# Patient Record
Sex: FEMALE | Race: WHITE | Employment: UNEMPLOYED | URBAN - METROPOLITAN AREA
[De-identification: names, ages, dates, MRNs, and addresses within clinical notes are randomized per-mention and may not be internally consistent; named-entity substitution may affect disease eponyms.]

---

## 2017-04-10 DIAGNOSIS — J41.8 MIXED SIMPLE AND MUCOPURULENT CHRONIC BRONCHITIS (HCC): Primary | ICD-10-CM

## 2017-05-15 ENCOUNTER — TELEPHONE (OUTPATIENT)
Dept: FAMILY MEDICINE CLINIC | Age: 57
End: 2017-05-15

## 2017-06-21 RX ORDER — OMEGA-3S/DHA/EPA/FISH OIL/D3 300MG-1000
CAPSULE ORAL
Qty: 30 TABLET | Refills: 0 | Status: SHIPPED | OUTPATIENT
Start: 2017-06-21 | End: 2018-02-27 | Stop reason: SDUPTHER

## 2017-06-21 RX ORDER — IPRATROPIUM BROMIDE AND ALBUTEROL SULFATE 2.5; .5 MG/3ML; MG/3ML
SOLUTION RESPIRATORY (INHALATION)
Qty: 360 ML | Refills: 0 | Status: SHIPPED | OUTPATIENT
Start: 2017-06-21 | End: 2017-07-14 | Stop reason: SDUPTHER

## 2017-06-21 RX ORDER — PANTOPRAZOLE SODIUM 20 MG/1
TABLET, DELAYED RELEASE ORAL
Qty: 30 TABLET | Refills: 0 | Status: SHIPPED | OUTPATIENT
Start: 2017-06-21 | End: 2017-07-19 | Stop reason: SDUPTHER

## 2017-06-21 RX ORDER — FLUTICASONE PROPIONATE 50 MCG
SPRAY, SUSPENSION (ML) NASAL
Qty: 1 BOTTLE | Refills: 0 | Status: SHIPPED | OUTPATIENT
Start: 2017-06-21 | End: 2017-07-19 | Stop reason: SDUPTHER

## 2017-06-21 RX ORDER — FAMOTIDINE 20 MG/1
TABLET, FILM COATED ORAL
Qty: 30 TABLET | Refills: 0 | Status: SHIPPED | OUTPATIENT
Start: 2017-06-21 | End: 2018-02-27 | Stop reason: SDUPTHER

## 2017-07-14 DIAGNOSIS — J41.8 MIXED SIMPLE AND MUCOPURULENT CHRONIC BRONCHITIS (HCC): ICD-10-CM

## 2017-07-14 RX ORDER — ALBUTEROL SULFATE 90 UG/1
AEROSOL, METERED RESPIRATORY (INHALATION)
Qty: 1 INHALER | Refills: 0 | Status: SHIPPED | OUTPATIENT
Start: 2017-07-14 | End: 2017-08-11 | Stop reason: SDUPTHER

## 2017-07-14 RX ORDER — IPRATROPIUM BROMIDE AND ALBUTEROL SULFATE 2.5; .5 MG/3ML; MG/3ML
SOLUTION RESPIRATORY (INHALATION)
Qty: 30 VIAL | Refills: 0 | Status: SHIPPED | OUTPATIENT
Start: 2017-07-14 | End: 2018-02-27 | Stop reason: SDUPTHER

## 2017-07-20 RX ORDER — PANTOPRAZOLE SODIUM 20 MG/1
TABLET, DELAYED RELEASE ORAL
Qty: 30 TABLET | Refills: 0 | Status: SHIPPED | OUTPATIENT
Start: 2017-07-20 | End: 2017-08-24 | Stop reason: SDUPTHER

## 2017-07-20 RX ORDER — FLUTICASONE PROPIONATE 50 MCG
SPRAY, SUSPENSION (ML) NASAL
Qty: 1 BOTTLE | Refills: 0 | Status: SHIPPED | OUTPATIENT
Start: 2017-07-20 | End: 2017-08-24 | Stop reason: SDUPTHER

## 2017-08-11 DIAGNOSIS — J41.8 MIXED SIMPLE AND MUCOPURULENT CHRONIC BRONCHITIS (HCC): ICD-10-CM

## 2017-08-11 RX ORDER — ALBUTEROL SULFATE 90 MCG
HFA AEROSOL WITH ADAPTER (GRAM) INHALATION
Qty: 6.7 G | Refills: 0 | Status: SHIPPED | OUTPATIENT
Start: 2017-08-11 | End: 2017-09-03 | Stop reason: SDUPTHER

## 2017-09-03 DIAGNOSIS — J41.8 MIXED SIMPLE AND MUCOPURULENT CHRONIC BRONCHITIS (HCC): ICD-10-CM

## 2017-09-05 RX ORDER — ALBUTEROL SULFATE 90 MCG
HFA AEROSOL WITH ADAPTER (GRAM) INHALATION
Qty: 6.7 G | Refills: 0 | Status: SHIPPED | OUTPATIENT
Start: 2017-09-05 | End: 2017-09-27 | Stop reason: SDUPTHER

## 2017-09-27 DIAGNOSIS — J41.8 MIXED SIMPLE AND MUCOPURULENT CHRONIC BRONCHITIS (HCC): ICD-10-CM

## 2017-09-28 RX ORDER — ALBUTEROL SULFATE 90 MCG
HFA AEROSOL WITH ADAPTER (GRAM) INHALATION
Qty: 6.7 G | Refills: 0 | Status: SHIPPED | OUTPATIENT
Start: 2017-09-28 | End: 2017-10-24 | Stop reason: SDUPTHER

## 2017-10-24 DIAGNOSIS — J41.8 MIXED SIMPLE AND MUCOPURULENT CHRONIC BRONCHITIS (HCC): ICD-10-CM

## 2017-10-25 RX ORDER — ALBUTEROL SULFATE 90 MCG
HFA AEROSOL WITH ADAPTER (GRAM) INHALATION
Qty: 6.7 G | Refills: 0 | Status: SHIPPED | OUTPATIENT
Start: 2017-10-25 | End: 2018-02-27 | Stop reason: SDUPTHER

## 2018-02-27 DIAGNOSIS — J41.8 MIXED SIMPLE AND MUCOPURULENT CHRONIC BRONCHITIS (HCC): ICD-10-CM

## 2018-02-27 RX ORDER — FAMOTIDINE 20 MG/1
TABLET, FILM COATED ORAL
Qty: 30 TABLET | Refills: 0 | Status: SHIPPED | OUTPATIENT
Start: 2018-02-27

## 2018-02-27 RX ORDER — OMEGA-3S/DHA/EPA/FISH OIL/D3 300MG-1000
CAPSULE ORAL
Qty: 30 TABLET | Refills: 0 | Status: SHIPPED | OUTPATIENT
Start: 2018-02-27

## 2018-02-27 RX ORDER — ALBUTEROL SULFATE 90 MCG
HFA AEROSOL WITH ADAPTER (GRAM) INHALATION
Qty: 6.7 G | Refills: 0 | Status: SHIPPED | OUTPATIENT
Start: 2018-02-27 | End: 2020-06-21 | Stop reason: SDUPTHER

## 2018-02-27 RX ORDER — IPRATROPIUM BROMIDE AND ALBUTEROL SULFATE 2.5; .5 MG/3ML; MG/3ML
SOLUTION RESPIRATORY (INHALATION)
Qty: 90 ML | Refills: 0 | Status: SHIPPED | OUTPATIENT
Start: 2018-02-27 | End: 2020-06-21 | Stop reason: SDUPTHER

## 2018-03-23 RX ORDER — FLUTICASONE PROPIONATE 50 MCG
SPRAY, SUSPENSION (ML) NASAL
Qty: 1 BOTTLE | Refills: 0 | Status: SHIPPED | OUTPATIENT
Start: 2018-03-23

## 2018-03-23 RX ORDER — PANTOPRAZOLE SODIUM 20 MG/1
TABLET, DELAYED RELEASE ORAL
Qty: 30 TABLET | Refills: 0 | Status: SHIPPED | OUTPATIENT
Start: 2018-03-23

## 2019-08-15 ENCOUNTER — HOSPITAL ENCOUNTER (OUTPATIENT)
Dept: GENERAL RADIOLOGY | Age: 59
Discharge: HOME | End: 2019-08-17
Payer: COMMERCIAL

## 2019-08-15 ENCOUNTER — HOSPITAL ENCOUNTER (OUTPATIENT)
Age: 59
Discharge: HOME | End: 2019-08-17
Payer: COMMERCIAL

## 2019-08-15 DIAGNOSIS — J44.1 CHRONIC OBSTRUCTIVE PULMONARY DISEASE WITH ACUTE EXACERBATION (HCC): ICD-10-CM

## 2019-08-15 DIAGNOSIS — R52 PAIN: ICD-10-CM

## 2019-08-15 PROCEDURE — 72220 X-RAY EXAM SACRUM TAILBONE: CPT

## 2019-08-15 PROCEDURE — 71046 X-RAY EXAM CHEST 2 VIEWS: CPT

## 2019-08-20 ENCOUNTER — HOSPITAL ENCOUNTER (OUTPATIENT)
Age: 59
Setting detail: SPECIMEN
Discharge: HOME | End: 2019-08-20
Payer: COMMERCIAL

## 2019-08-23 LAB
HPV SAMPLE: NORMAL
HPV, GENOTYPE 16: NOT DETECTED
HPV, GENOTYPE 18: NOT DETECTED
HPV, HIGH RISK OTHER: NOT DETECTED
HPV, INTERPRETATION: NORMAL
SPECIMEN DESCRIPTION: NORMAL

## 2019-08-29 LAB — CYTOLOGY REPORT: NORMAL

## 2019-09-29 ENCOUNTER — APPOINTMENT (OUTPATIENT)
Dept: GENERAL RADIOLOGY | Age: 59
End: 2019-09-29
Payer: COMMERCIAL

## 2019-09-29 ENCOUNTER — HOSPITAL ENCOUNTER (EMERGENCY)
Age: 59
Discharge: HOME | End: 2019-09-29
Attending: EMERGENCY MEDICINE
Payer: COMMERCIAL

## 2019-09-29 VITALS
DIASTOLIC BLOOD PRESSURE: 78 MMHG | WEIGHT: 107 LBS | HEIGHT: 60 IN | TEMPERATURE: 98.5 F | SYSTOLIC BLOOD PRESSURE: 121 MMHG | RESPIRATION RATE: 20 BRPM | HEART RATE: 100 BPM | OXYGEN SATURATION: 100 % | BODY MASS INDEX: 21.01 KG/M2

## 2019-09-29 DIAGNOSIS — J44.1 COPD EXACERBATION (HCC): Primary | ICD-10-CM

## 2019-09-29 LAB
ABSOLUTE EOS #: 0.14 K/UL (ref 0–0.44)
ABSOLUTE IMMATURE GRANULOCYTE: 0.05 K/UL (ref 0–0.3)
ABSOLUTE LYMPH #: 1.91 K/UL (ref 1.1–3.7)
ABSOLUTE MONO #: 0.66 K/UL (ref 0.1–1.2)
ANION GAP SERPL CALCULATED.3IONS-SCNC: 13 MMOL/L (ref 9–17)
BASOPHILS # BLD: 0 % (ref 0–2)
BASOPHILS ABSOLUTE: 0.05 K/UL (ref 0–0.2)
BUN BLDV-MCNC: 7 MG/DL (ref 6–20)
BUN/CREAT BLD: ABNORMAL (ref 9–20)
CALCIUM SERPL-MCNC: 9.6 MG/DL (ref 8.6–10.4)
CHLORIDE BLD-SCNC: 98 MMOL/L (ref 98–107)
CO2: 27 MMOL/L (ref 20–31)
CREAT SERPL-MCNC: 0.4 MG/DL (ref 0.5–0.9)
DIFFERENTIAL TYPE: ABNORMAL
EOSINOPHILS RELATIVE PERCENT: 1 % (ref 1–4)
GFR AFRICAN AMERICAN: >60 ML/MIN
GFR NON-AFRICAN AMERICAN: >60 ML/MIN
GFR SERPL CREATININE-BSD FRML MDRD: ABNORMAL ML/MIN/{1.73_M2}
GFR SERPL CREATININE-BSD FRML MDRD: ABNORMAL ML/MIN/{1.73_M2}
GLUCOSE BLD-MCNC: 139 MG/DL (ref 70–99)
HCT VFR BLD CALC: 39.4 % (ref 36.3–47.1)
HEMOGLOBIN: 12.5 G/DL (ref 11.9–15.1)
IMMATURE GRANULOCYTES: 0 %
LYMPHOCYTES # BLD: 16 % (ref 24–43)
MCH RBC QN AUTO: 29.6 PG (ref 25.2–33.5)
MCHC RBC AUTO-ENTMCNC: 31.7 G/DL (ref 28.4–34.8)
MCV RBC AUTO: 93.4 FL (ref 82.6–102.9)
MONOCYTES # BLD: 5 % (ref 3–12)
NRBC AUTOMATED: 0 PER 100 WBC
PDW BLD-RTO: 12.3 % (ref 11.8–14.4)
PLATELET # BLD: 231 K/UL (ref 138–453)
PLATELET ESTIMATE: ABNORMAL
PMV BLD AUTO: 12 FL (ref 8.1–13.5)
POTASSIUM SERPL-SCNC: 3.7 MMOL/L (ref 3.7–5.3)
RBC # BLD: 4.22 M/UL (ref 3.95–5.11)
RBC # BLD: ABNORMAL 10*6/UL
SEG NEUTROPHILS: 78 % (ref 36–65)
SEGMENTED NEUTROPHILS ABSOLUTE COUNT: 9.34 K/UL (ref 1.5–8.1)
SODIUM BLD-SCNC: 138 MMOL/L (ref 135–144)
TROPONIN INTERP: NORMAL
TROPONIN T: NORMAL NG/ML
TROPONIN, HIGH SENSITIVITY: 6 NG/L (ref 0–14)
WBC # BLD: 12.2 K/UL (ref 3.5–11.3)
WBC # BLD: ABNORMAL 10*3/UL

## 2019-09-29 PROCEDURE — 93005 ELECTROCARDIOGRAM TRACING: CPT | Performed by: STUDENT IN AN ORGANIZED HEALTH CARE EDUCATION/TRAINING PROGRAM

## 2019-09-29 PROCEDURE — 71046 X-RAY EXAM CHEST 2 VIEWS: CPT

## 2019-09-29 PROCEDURE — 85025 COMPLETE CBC W/AUTO DIFF WBC: CPT

## 2019-09-29 PROCEDURE — 6370000000 HC RX 637 (ALT 250 FOR IP): Performed by: STUDENT IN AN ORGANIZED HEALTH CARE EDUCATION/TRAINING PROGRAM

## 2019-09-29 PROCEDURE — 6360000002 HC RX W HCPCS: Performed by: EMERGENCY MEDICINE

## 2019-09-29 PROCEDURE — 99285 EMERGENCY DEPT VISIT HI MDM: CPT

## 2019-09-29 PROCEDURE — 94640 AIRWAY INHALATION TREATMENT: CPT

## 2019-09-29 PROCEDURE — 80048 BASIC METABOLIC PNL TOTAL CA: CPT

## 2019-09-29 PROCEDURE — 84484 ASSAY OF TROPONIN QUANT: CPT

## 2019-09-29 RX ORDER — ALBUTEROL SULFATE 2.5 MG/3ML
5 SOLUTION RESPIRATORY (INHALATION) EVERY 6 HOURS PRN
Status: DISCONTINUED | OUTPATIENT
Start: 2019-09-29 | End: 2019-09-29 | Stop reason: HOSPADM

## 2019-09-29 RX ORDER — PREDNISONE 20 MG/1
40 TABLET ORAL DAILY
Qty: 8 TABLET | Refills: 0 | Status: SHIPPED | OUTPATIENT
Start: 2019-09-29 | End: 2019-10-03

## 2019-09-29 RX ORDER — PREDNISONE 20 MG/1
40 TABLET ORAL DAILY
Status: DISCONTINUED | OUTPATIENT
Start: 2019-09-29 | End: 2019-09-29 | Stop reason: HOSPADM

## 2019-09-29 RX ADMIN — PREDNISONE 40 MG: 20 TABLET ORAL at 16:58

## 2019-09-29 RX ADMIN — ALBUTEROL SULFATE 5 MG: 2.5 SOLUTION RESPIRATORY (INHALATION) at 16:08

## 2019-09-29 ASSESSMENT — ENCOUNTER SYMPTOMS
DIARRHEA: 0
ABDOMINAL PAIN: 0
VOMITING: 0
SHORTNESS OF BREATH: 1
CHEST TIGHTNESS: 0
SINUS PRESSURE: 0
EYES NEGATIVE: 1
SORE THROAT: 0
ABDOMINAL DISTENTION: 0
NAUSEA: 0
COUGH: 1
SINUS PAIN: 0
WHEEZING: 0
RHINORRHEA: 0
ALLERGIC/IMMUNOLOGIC NEGATIVE: 1

## 2019-09-29 ASSESSMENT — PAIN DESCRIPTION - PROGRESSION: CLINICAL_PROGRESSION: NOT CHANGED

## 2019-09-29 ASSESSMENT — PAIN DESCRIPTION - FREQUENCY: FREQUENCY: CONTINUOUS

## 2019-09-29 ASSESSMENT — PAIN DESCRIPTION - LOCATION: LOCATION: HEAD;CHEST

## 2019-09-29 ASSESSMENT — PAIN DESCRIPTION - ONSET: ONSET: ON-GOING

## 2019-09-30 LAB
EKG ATRIAL RATE: 100 BPM
EKG P AXIS: 68 DEGREES
EKG P-R INTERVAL: 110 MS
EKG Q-T INTERVAL: 322 MS
EKG QRS DURATION: 78 MS
EKG QTC CALCULATION (BAZETT): 415 MS
EKG R AXIS: 87 DEGREES
EKG T AXIS: 61 DEGREES
EKG VENTRICULAR RATE: 100 BPM

## 2019-09-30 PROCEDURE — 93010 ELECTROCARDIOGRAM REPORT: CPT | Performed by: INTERNAL MEDICINE

## 2020-01-17 ENCOUNTER — APPOINTMENT (OUTPATIENT)
Dept: GENERAL RADIOLOGY | Age: 60
End: 2020-01-17
Payer: COMMERCIAL

## 2020-01-17 ENCOUNTER — HOSPITAL ENCOUNTER (EMERGENCY)
Age: 60
Discharge: HOME | End: 2020-01-17
Attending: EMERGENCY MEDICINE
Payer: COMMERCIAL

## 2020-01-17 VITALS
OXYGEN SATURATION: 95 % | TEMPERATURE: 98.1 F | SYSTOLIC BLOOD PRESSURE: 159 MMHG | BODY MASS INDEX: 20.81 KG/M2 | RESPIRATION RATE: 22 BRPM | HEIGHT: 60 IN | DIASTOLIC BLOOD PRESSURE: 95 MMHG | WEIGHT: 106 LBS | HEART RATE: 94 BPM

## 2020-01-17 LAB
ABSOLUTE EOS #: 0.24 K/UL (ref 0–0.44)
ABSOLUTE IMMATURE GRANULOCYTE: 0.04 K/UL (ref 0–0.3)
ABSOLUTE LYMPH #: 2.72 K/UL (ref 1.1–3.7)
ABSOLUTE MONO #: 0.75 K/UL (ref 0.1–1.2)
ANION GAP SERPL CALCULATED.3IONS-SCNC: 15 MMOL/L (ref 9–17)
BASOPHILS # BLD: 1 % (ref 0–2)
BASOPHILS ABSOLUTE: 0.07 K/UL (ref 0–0.2)
BUN BLDV-MCNC: 17 MG/DL (ref 6–20)
BUN/CREAT BLD: ABNORMAL (ref 9–20)
CALCIUM SERPL-MCNC: 9.8 MG/DL (ref 8.6–10.4)
CHLORIDE BLD-SCNC: 103 MMOL/L (ref 98–107)
CO2: 25 MMOL/L (ref 20–31)
CREAT SERPL-MCNC: 0.74 MG/DL (ref 0.5–0.9)
DIFFERENTIAL TYPE: NORMAL
EOSINOPHILS RELATIVE PERCENT: 3 % (ref 1–4)
GFR AFRICAN AMERICAN: >60 ML/MIN
GFR NON-AFRICAN AMERICAN: >60 ML/MIN
GFR SERPL CREATININE-BSD FRML MDRD: ABNORMAL ML/MIN/{1.73_M2}
GFR SERPL CREATININE-BSD FRML MDRD: ABNORMAL ML/MIN/{1.73_M2}
GLUCOSE BLD-MCNC: 104 MG/DL (ref 70–99)
HCT VFR BLD CALC: 41.4 % (ref 36.3–47.1)
HEMOGLOBIN: 13 G/DL (ref 11.9–15.1)
IMMATURE GRANULOCYTES: 0 %
LYMPHOCYTES # BLD: 30 % (ref 24–43)
MCH RBC QN AUTO: 28.9 PG (ref 25.2–33.5)
MCHC RBC AUTO-ENTMCNC: 31.4 G/DL (ref 28.4–34.8)
MCV RBC AUTO: 92 FL (ref 82.6–102.9)
MONOCYTES # BLD: 8 % (ref 3–12)
NRBC AUTOMATED: 0 PER 100 WBC
PDW BLD-RTO: 14.3 % (ref 11.8–14.4)
PLATELET # BLD: 262 K/UL (ref 138–453)
PLATELET ESTIMATE: NORMAL
PMV BLD AUTO: 11.4 FL (ref 8.1–13.5)
POTASSIUM SERPL-SCNC: 4.2 MMOL/L (ref 3.7–5.3)
RBC # BLD: 4.5 M/UL (ref 3.95–5.11)
RBC # BLD: NORMAL 10*6/UL
SEG NEUTROPHILS: 58 % (ref 36–65)
SEGMENTED NEUTROPHILS ABSOLUTE COUNT: 5.13 K/UL (ref 1.5–8.1)
SODIUM BLD-SCNC: 143 MMOL/L (ref 135–144)
TROPONIN INTERP: NORMAL
TROPONIN T: NORMAL NG/ML
TROPONIN, HIGH SENSITIVITY: 8 NG/L (ref 0–14)
WBC # BLD: 9 K/UL (ref 3.5–11.3)
WBC # BLD: NORMAL 10*3/UL

## 2020-01-17 PROCEDURE — 84484 ASSAY OF TROPONIN QUANT: CPT

## 2020-01-17 PROCEDURE — 6370000000 HC RX 637 (ALT 250 FOR IP): Performed by: STUDENT IN AN ORGANIZED HEALTH CARE EDUCATION/TRAINING PROGRAM

## 2020-01-17 PROCEDURE — 71046 X-RAY EXAM CHEST 2 VIEWS: CPT

## 2020-01-17 PROCEDURE — 93005 ELECTROCARDIOGRAM TRACING: CPT | Performed by: STUDENT IN AN ORGANIZED HEALTH CARE EDUCATION/TRAINING PROGRAM

## 2020-01-17 PROCEDURE — 94640 AIRWAY INHALATION TREATMENT: CPT

## 2020-01-17 PROCEDURE — 85025 COMPLETE CBC W/AUTO DIFF WBC: CPT

## 2020-01-17 PROCEDURE — 99285 EMERGENCY DEPT VISIT HI MDM: CPT

## 2020-01-17 PROCEDURE — 80048 BASIC METABOLIC PNL TOTAL CA: CPT

## 2020-01-17 PROCEDURE — 6360000002 HC RX W HCPCS: Performed by: STUDENT IN AN ORGANIZED HEALTH CARE EDUCATION/TRAINING PROGRAM

## 2020-01-17 RX ORDER — PREDNISONE 20 MG/1
40 TABLET ORAL ONCE
Status: COMPLETED | OUTPATIENT
Start: 2020-01-17 | End: 2020-01-17

## 2020-01-17 RX ORDER — PREDNISONE 10 MG/1
TABLET ORAL
Qty: 20 TABLET | Refills: 0 | Status: SHIPPED | OUTPATIENT
Start: 2020-01-17 | End: 2020-01-27

## 2020-01-17 RX ADMIN — ALBUTEROL SULFATE 5 MG: 5 SOLUTION RESPIRATORY (INHALATION) at 17:23

## 2020-01-17 RX ADMIN — PREDNISONE 40 MG: 20 TABLET ORAL at 16:38

## 2020-01-17 ASSESSMENT — ENCOUNTER SYMPTOMS
VOMITING: 0
PHOTOPHOBIA: 0
STRIDOR: 0
BACK PAIN: 0
SHORTNESS OF BREATH: 1
NAUSEA: 0
SORE THROAT: 0
ABDOMINAL PAIN: 0
COUGH: 1
RHINORRHEA: 0

## 2020-01-17 NOTE — ED PROVIDER NOTES
Spouse name: Not on file    Number of children: Not on file    Years of education: Not on file    Highest education level: Not on file   Occupational History    Not on file   Social Needs    Financial resource strain: Not on file    Food insecurity:     Worry: Not on file     Inability: Not on file    Transportation needs:     Medical: Not on file     Non-medical: Not on file   Tobacco Use    Smoking status: Current Every Day Smoker     Packs/day: 0.50     Years: 38.00     Pack years: 19.00     Types: Cigarettes    Smokeless tobacco: Never Used   Substance and Sexual Activity    Alcohol use: Yes     Comment: once in a while  1 - 2 x year    Drug use: No    Sexual activity: Not Currently   Lifestyle    Physical activity:     Days per week: Not on file     Minutes per session: Not on file    Stress: Not on file   Relationships    Social connections:     Talks on phone: Not on file     Gets together: Not on file     Attends [employer]ist service: Not on file     Active member of club or organization: Not on file     Attends meetings of clubs or organizations: Not on file     Relationship status: Not on file    Intimate partner violence:     Fear of current or ex partner: Not on file     Emotionally abused: Not on file     Physically abused: Not on file     Forced sexual activity: Not on file   Other Topics Concern    Not on file   Social History Narrative    Not on file       Family History   Problem Relation Age of Onset    Early Death Father        Allergies:  Neosporin [neomycin-polymyx-gramicid] and Penicillins    Home Medications:  Prior to Admission medications    Medication Sig Start Date End Date Taking?  Authorizing Provider   predniSONE (DELTASONE) 10 MG tablet Take 4 tablets by mouth once daily for 5 days 1/17/20 1/27/20 Yes Carlos Angeles DO   pantoprazole (PROTONIX) 20 MG tablet TAKE 1 TABLET BY MOUTH DAILY 3/23/18   ALEXIA Hartman - CNP   fluticasone (FLONASE) 50 MCG/ACT nasal spray SHAKE orders placed or performed during the hospital encounter of 01/17/20   CBC WITH AUTO DIFFERENTIAL   Result Value Ref Range    WBC 9.0 3.5 - 11.3 k/uL    RBC 4.50 3.95 - 5.11 m/uL    Hemoglobin 13.0 11.9 - 15.1 g/dL    Hematocrit 41.4 36.3 - 47.1 %    MCV 92.0 82.6 - 102.9 fL    MCH 28.9 25.2 - 33.5 pg    MCHC 31.4 28.4 - 34.8 g/dL    RDW 14.3 11.8 - 14.4 %    Platelets 346 838 - 862 k/uL    MPV 11.4 8.1 - 13.5 fL    NRBC Automated 0.0 0.0 per 100 WBC    Differential Type NOT REPORTED     Seg Neutrophils 58 36 - 65 %    Lymphocytes 30 24 - 43 %    Monocytes 8 3 - 12 %    Eosinophils % 3 1 - 4 %    Basophils 1 0 - 2 %    Immature Granulocytes 0 0 %    Segs Absolute 5.13 1.50 - 8.10 k/uL    Absolute Lymph # 2.72 1.10 - 3.70 k/uL    Absolute Mono # 0.75 0.10 - 1.20 k/uL    Absolute Eos # 0.24 0.00 - 0.44 k/uL    Basophils Absolute 0.07 0.00 - 0.20 k/uL    Absolute Immature Granulocyte 0.04 0.00 - 0.30 k/uL    WBC Morphology NOT REPORTED     RBC Morphology NOT REPORTED     Platelet Estimate NOT REPORTED    Basic Metabolic Panel   Result Value Ref Range    Glucose 104 (H) 70 - 99 mg/dL    BUN 17 6 - 20 mg/dL    CREATININE 0.74 0.50 - 0.90 mg/dL    Bun/Cre Ratio NOT REPORTED 9 - 20    Calcium 9.8 8.6 - 10.4 mg/dL    Sodium 143 135 - 144 mmol/L    Potassium 4.2 3.7 - 5.3 mmol/L    Chloride 103 98 - 107 mmol/L    CO2 25 20 - 31 mmol/L    Anion Gap 15 9 - 17 mmol/L    GFR Non-African American >60 >60 mL/min    GFR African American >60 >60 mL/min    GFR Comment          GFR Staging NOT REPORTED    Troponin   Result Value Ref Range    Troponin, High Sensitivity 8 0 - 14 ng/L    Troponin T NOT REPORTED <0.03 ng/mL    Troponin Interp NOT REPORTED            RADIOLOGY:  None    EKG  None    All EKG's are interpreted by the Emergency Department Physician who either signs or Co-signs this chart in the absence of a cardiologist.    EMERGENCY DEPARTMENT COURSE:  56-year-old female history of COPD presents with reported shortness of

## 2020-01-17 NOTE — ED NOTES
Pt. Ambulatory with steady gait to ER room 20 from triage  Pt. Presents with shortness of breath that started earlier today; pt has a history of COPD and is supposed to wear oxygen via nasal cannula at night but has had trouble getting this ordered  Pt. Is tachycardic on vitals assessment, hypertensive and appears anxious; pt oxygen saturation 95% on room air on arrival; respiratory at bedside  Pt. Sounds clear throughout all lobes of lungs; per respiratory to hold off treatment unless ordered by physician  Pt. Placed in gown, EKG obtained, IV access established  Pt. Begins to calm down during triage examination and states she is feeling better at this time  Pt. A/Ox4, RR even and unlabored, NAD at this time  Pt.  Given call light and warm blankets  Awaiting further orders  Will continue to monitor and reassess     Ileana Pink RN  01/17/20 4283

## 2020-01-17 NOTE — CARE COORDINATION
Met with patient to discuss issues with oxygen. She states that she has been using a friend's oxygen machine at night, because her original one was left in a storage unit. She states she has an appointment with Respiratory Specialist, [address]. Per [facility] her appointment is scheduled with Dr Arturo Plummer on 3-25-20 at 1330. The pulmonary office is closed at this time,  Patient encouraged to call the office on 1-, to explain her current situation, and ask if she can have an earlier appointment. patient expresses understanding. Discussed skilled nursing visits, and she declines at this time. She states she understands how to take her medications, and does not see the need for a nurse at this time.

## 2020-01-18 LAB
EKG ATRIAL RATE: 89 BPM
EKG P AXIS: 73 DEGREES
EKG P-R INTERVAL: 126 MS
EKG Q-T INTERVAL: 356 MS
EKG QRS DURATION: 72 MS
EKG QTC CALCULATION (BAZETT): 433 MS
EKG R AXIS: 78 DEGREES
EKG T AXIS: 67 DEGREES
EKG VENTRICULAR RATE: 89 BPM

## 2020-01-18 PROCEDURE — 93010 ELECTROCARDIOGRAM REPORT: CPT | Performed by: INTERNAL MEDICINE

## 2020-06-21 ENCOUNTER — APPOINTMENT (OUTPATIENT)
Dept: GENERAL RADIOLOGY | Age: 60
End: 2020-06-21
Payer: COMMERCIAL

## 2020-06-21 ENCOUNTER — HOSPITAL ENCOUNTER (EMERGENCY)
Age: 60
Discharge: HOME | End: 2020-06-21
Attending: EMERGENCY MEDICINE
Payer: COMMERCIAL

## 2020-06-21 VITALS
WEIGHT: 106 LBS | OXYGEN SATURATION: 100 % | HEART RATE: 87 BPM | HEIGHT: 60 IN | TEMPERATURE: 97.1 F | DIASTOLIC BLOOD PRESSURE: 91 MMHG | RESPIRATION RATE: 21 BRPM | SYSTOLIC BLOOD PRESSURE: 108 MMHG | BODY MASS INDEX: 20.81 KG/M2

## 2020-06-21 LAB
ABSOLUTE EOS #: 0.16 K/UL (ref 0–0.44)
ABSOLUTE IMMATURE GRANULOCYTE: <0.03 K/UL (ref 0–0.3)
ABSOLUTE LYMPH #: 2.51 K/UL (ref 1.1–3.7)
ABSOLUTE MONO #: 0.39 K/UL (ref 0.1–1.2)
ALLEN TEST: ABNORMAL
ANION GAP SERPL CALCULATED.3IONS-SCNC: 14 MMOL/L (ref 9–17)
BASOPHILS # BLD: 1 % (ref 0–2)
BASOPHILS ABSOLUTE: 0.07 K/UL (ref 0–0.2)
BUN BLDV-MCNC: 10 MG/DL (ref 6–20)
BUN/CREAT BLD: ABNORMAL (ref 9–20)
CALCIUM SERPL-MCNC: 9.4 MG/DL (ref 8.6–10.4)
CARBOXYHEMOGLOBIN: 3.8 % (ref 0–5)
CHLORIDE BLD-SCNC: 100 MMOL/L (ref 98–107)
CO2: 24 MMOL/L (ref 20–31)
CREAT SERPL-MCNC: 0.5 MG/DL (ref 0.5–0.9)
D-DIMER QUANTITATIVE: 0.21 MG/L FEU
DIFFERENTIAL TYPE: ABNORMAL
EOSINOPHILS RELATIVE PERCENT: 2 % (ref 1–4)
FIO2: ABNORMAL
GFR AFRICAN AMERICAN: >60 ML/MIN
GFR NON-AFRICAN AMERICAN: >60 ML/MIN
GFR SERPL CREATININE-BSD FRML MDRD: ABNORMAL ML/MIN/{1.73_M2}
GFR SERPL CREATININE-BSD FRML MDRD: ABNORMAL ML/MIN/{1.73_M2}
GLUCOSE BLD-MCNC: 123 MG/DL (ref 70–99)
HCO3 VENOUS: 28.3 MMOL/L (ref 24–30)
HCT VFR BLD CALC: 48.2 % (ref 36.3–47.1)
HEMOGLOBIN: 15.5 G/DL (ref 11.9–15.1)
IMMATURE GRANULOCYTES: 0 %
LYMPHOCYTES # BLD: 34 % (ref 24–43)
MCH RBC QN AUTO: 29.5 PG (ref 25.2–33.5)
MCHC RBC AUTO-ENTMCNC: 32.2 G/DL (ref 28.4–34.8)
MCV RBC AUTO: 91.6 FL (ref 82.6–102.9)
METHEMOGLOBIN: ABNORMAL % (ref 0–1.5)
MODE: ABNORMAL
MONOCYTES # BLD: 5 % (ref 3–12)
NEGATIVE BASE EXCESS, VEN: ABNORMAL MMOL/L (ref 0–2)
NOTIFICATION TIME: ABNORMAL
NOTIFICATION: ABNORMAL
NRBC AUTOMATED: 0 PER 100 WBC
O2 DEVICE/FLOW/%: ABNORMAL
O2 SAT, VEN: 86.8 % (ref 60–85)
OXYHEMOGLOBIN: ABNORMAL % (ref 95–98)
PATIENT TEMP: 37
PCO2, VEN, TEMP ADJ: ABNORMAL MMHG (ref 39–55)
PCO2, VEN: 47.5 (ref 39–55)
PDW BLD-RTO: 14 % (ref 11.8–14.4)
PEEP/CPAP: ABNORMAL
PH VENOUS: 7.39 (ref 7.32–7.42)
PH, VEN, TEMP ADJ: ABNORMAL (ref 7.32–7.42)
PLATELET # BLD: 273 K/UL (ref 138–453)
PLATELET ESTIMATE: ABNORMAL
PMV BLD AUTO: 11.2 FL (ref 8.1–13.5)
PO2, VEN, TEMP ADJ: ABNORMAL MMHG (ref 30–50)
PO2, VEN: 50 (ref 30–50)
POSITIVE BASE EXCESS, VEN: 3 MMOL/L (ref 0–2)
POTASSIUM SERPL-SCNC: 4.3 MMOL/L (ref 3.7–5.3)
PSV: ABNORMAL
PT. POSITION: ABNORMAL
RBC # BLD: 5.26 M/UL (ref 3.95–5.11)
RBC # BLD: ABNORMAL 10*6/UL
RESPIRATORY RATE: ABNORMAL
SAMPLE SITE: ABNORMAL
SEG NEUTROPHILS: 58 % (ref 36–65)
SEGMENTED NEUTROPHILS ABSOLUTE COUNT: 4.16 K/UL (ref 1.5–8.1)
SET RATE: ABNORMAL
SODIUM BLD-SCNC: 138 MMOL/L (ref 135–144)
TEXT FOR RESPIRATORY: ABNORMAL
TOTAL HB: ABNORMAL G/DL (ref 12–16)
TOTAL RATE: ABNORMAL
TROPONIN INTERP: NORMAL
TROPONIN INTERP: NORMAL
TROPONIN T: NORMAL NG/ML
TROPONIN T: NORMAL NG/ML
TROPONIN, HIGH SENSITIVITY: 7 NG/L (ref 0–14)
TROPONIN, HIGH SENSITIVITY: <6 NG/L (ref 0–14)
VT: ABNORMAL
WBC # BLD: 7.3 K/UL (ref 3.5–11.3)
WBC # BLD: ABNORMAL 10*3/UL

## 2020-06-21 PROCEDURE — 85379 FIBRIN DEGRADATION QUANT: CPT

## 2020-06-21 PROCEDURE — 94761 N-INVAS EAR/PLS OXIMETRY MLT: CPT

## 2020-06-21 PROCEDURE — 6360000002 HC RX W HCPCS: Performed by: STUDENT IN AN ORGANIZED HEALTH CARE EDUCATION/TRAINING PROGRAM

## 2020-06-21 PROCEDURE — 71045 X-RAY EXAM CHEST 1 VIEW: CPT

## 2020-06-21 PROCEDURE — 94640 AIRWAY INHALATION TREATMENT: CPT

## 2020-06-21 PROCEDURE — 6370000000 HC RX 637 (ALT 250 FOR IP): Performed by: STUDENT IN AN ORGANIZED HEALTH CARE EDUCATION/TRAINING PROGRAM

## 2020-06-21 PROCEDURE — 82805 BLOOD GASES W/O2 SATURATION: CPT

## 2020-06-21 PROCEDURE — 96374 THER/PROPH/DIAG INJ IV PUSH: CPT

## 2020-06-21 PROCEDURE — 36415 COLL VENOUS BLD VENIPUNCTURE: CPT

## 2020-06-21 PROCEDURE — 2700000000 HC OXYGEN THERAPY PER DAY

## 2020-06-21 PROCEDURE — 85025 COMPLETE CBC W/AUTO DIFF WBC: CPT

## 2020-06-21 PROCEDURE — 84484 ASSAY OF TROPONIN QUANT: CPT

## 2020-06-21 PROCEDURE — 93005 ELECTROCARDIOGRAM TRACING: CPT | Performed by: STUDENT IN AN ORGANIZED HEALTH CARE EDUCATION/TRAINING PROGRAM

## 2020-06-21 PROCEDURE — 94664 DEMO&/EVAL PT USE INHALER: CPT

## 2020-06-21 PROCEDURE — 99285 EMERGENCY DEPT VISIT HI MDM: CPT

## 2020-06-21 PROCEDURE — 80048 BASIC METABOLIC PNL TOTAL CA: CPT

## 2020-06-21 RX ORDER — PREDNISONE 10 MG/1
TABLET ORAL
Qty: 20 TABLET | Refills: 0 | Status: SHIPPED | OUTPATIENT
Start: 2020-06-21 | End: 2020-07-01

## 2020-06-21 RX ORDER — IPRATROPIUM BROMIDE AND ALBUTEROL SULFATE 2.5; .5 MG/3ML; MG/3ML
SOLUTION RESPIRATORY (INHALATION)
Qty: 90 ML | Refills: 0 | Status: SHIPPED | OUTPATIENT
Start: 2020-06-21

## 2020-06-21 RX ORDER — ALBUTEROL SULFATE 90 UG/1
AEROSOL, METERED RESPIRATORY (INHALATION)
Qty: 6.7 G | Refills: 0 | Status: SHIPPED | OUTPATIENT
Start: 2020-06-21

## 2020-06-21 RX ORDER — ALBUTEROL SULFATE 90 UG/1
1-2 AEROSOL, METERED RESPIRATORY (INHALATION) EVERY 4 HOURS PRN
Qty: 1 INHALER | Refills: 0 | Status: SHIPPED | OUTPATIENT
Start: 2020-06-21

## 2020-06-21 RX ORDER — AZITHROMYCIN 250 MG/1
250 TABLET, FILM COATED ORAL SEE ADMIN INSTRUCTIONS
Qty: 6 TABLET | Refills: 0 | Status: SHIPPED | OUTPATIENT
Start: 2020-06-21 | End: 2020-06-26

## 2020-06-21 RX ORDER — ALBUTEROL SULFATE 90 UG/1
2 AEROSOL, METERED RESPIRATORY (INHALATION) EVERY 6 HOURS PRN
Status: DISCONTINUED | OUTPATIENT
Start: 2020-06-21 | End: 2020-06-21 | Stop reason: HOSPADM

## 2020-06-21 RX ORDER — METHYLPREDNISOLONE SODIUM SUCCINATE 125 MG/2ML
125 INJECTION, POWDER, LYOPHILIZED, FOR SOLUTION INTRAMUSCULAR; INTRAVENOUS ONCE
Status: COMPLETED | OUTPATIENT
Start: 2020-06-21 | End: 2020-06-21

## 2020-06-21 RX ADMIN — ALBUTEROL SULFATE 2 PUFF: 90 AEROSOL, METERED RESPIRATORY (INHALATION) at 15:04

## 2020-06-21 RX ADMIN — METHYLPREDNISOLONE SODIUM SUCCINATE 125 MG: 125 INJECTION, POWDER, FOR SOLUTION INTRAMUSCULAR; INTRAVENOUS at 15:02

## 2020-06-21 RX ADMIN — IPRATROPIUM BROMIDE 0.5 MG: 0.5 SOLUTION RESPIRATORY (INHALATION) at 15:00

## 2020-06-21 ASSESSMENT — ENCOUNTER SYMPTOMS
SHORTNESS OF BREATH: 1
ABDOMINAL PAIN: 0
NAUSEA: 0
COUGH: 1
BACK PAIN: 0
VOMITING: 0
SORE THROAT: 0

## 2020-06-21 NOTE — ED PROVIDER NOTES
[facility]  ED  Emergency Department Encounter  Emergency Medicine Resident     Pt Name: Courtney Jj  MRN: [id number]  [place] 1960  Date of evaluation: 6/21/20  PCP:  [facility] [place]       Chief Complaint   Patient presents with    Shortness of Breath       HISTORY [place]  (Location/Symptom, Timing/Onset, Context/Setting, Quality, Duration, Modifying Factors,Severity.)      Courtney Jj is a 63 y.o. female who presents with complaints of shortness of breath and productive cough. Patient with history of anxiety, COPD, GERD, depression. Patient states that for the last 2 days she has had worsening shortness of breath as well as a productive cough with clear sputum. She states that this feels like her typical COPD exacerbation but has been out of her home inhalers and her oxygen. Patient does wear oxygen at home but has not had to increase her requirements or use oxygen during the day. Patient denies chest pain, fevers, nausea, vomiting, diaphoresis, abdominal pain, lightheadedness, dizziness, lower extremity swelling or calf pain. Patient denies any history of DVT/PE, recent surgery, mobilization, hormone replacement, hemoptysis, active malignancy. PAST MEDICAL / SURGICAL / SOCIAL / FAMILY HISTORY      has a past medical history of Anxiety, COPD (chronic obstructive pulmonary disease) ([address] COPD exacerbation (HCC), Cough, Current every day smoker, Current every day smoker, Depression, GERD (gastroesophageal reflux disease), Headache(784.0), Mid sternal chest pain, and SOB (shortness of breath). has a past surgical history that includes Tubal ligation and Colonoscopy (4 28 14).      Social History     Socioeconomic History    Marital status:      Spouse name: Not on file    Number of children: Not on file    Years of education: Not on file    Highest education level: Not on file   Occupational History    Not on file   Social Needs  Financial resource strain: Not on file   Sami-Iftikhar insecurity     Worry: Not on file     Inability: Not on file   [employer] needs     Medical: Not on file     Non-medical: Not on file   Tobacco Use    Smoking status: Current Every Day Smoker     Packs/day: 0.50     Years: 38.00     Pack years: 19.00     Types: Cigarettes    Smokeless tobacco: Never Used   Substance and Sexual Activity    Alcohol use: Yes     Comment: once in a while  1 - 2 x year    Drug use: No    Sexual activity: Not Currently   Lifestyle    Physical activity     Days per week: Not on file     Minutes per session: Not on file    Stress: Not on file   Relationships    Social connections     Talks on phone: Not on file     Gets together: Not on file     Attends Scientology service: Not on file     Active member of club or organization: Not on file     Attends meetings of clubs or organizations: Not on file     Relationship status: Not on file    Intimate partner violence     Fear of current or ex partner: Not on file     Emotionally abused: Not on file     Physically abused: Not on file     Forced sexual activity: Not on file   Other Topics Concern    Not on file   Social History Narrative    Not on file       Family History   Problem Relation Age of Onset    Early Death Father         Allergies:  Neosporin [neomycin-polymyx-gramicid] and Penicillins    Home Medications:  Prior to Admission medications    Medication Sig Start Date End Date Taking?  Authorizing Provider   ipratropium-albuterol (DUONEB) 0.5-2.5 (3) MG/3ML SOLN nebulizer solution USE 3 ML(1 VIAL) VIA NEBULIZER EVERY 4 HOURS AS NEEDED 6/21/20  Yes Erwin Mcwilliams, DO   albuterol sulfate HFA (PROVENTIL HFA) 108 (90 Base) MCG/ACT inhaler INHALE 2 PUFFS BY MOUTH EVERY 6 HOURS AS NEEDED 6/21/20  Yes Erwin Mcwilliams, DO   albuterol sulfate HFA (PROVENTIL HFA) 108 (90 Base) MCG/ACT inhaler Inhale 1-2 puffs into the lungs every 4 hours as needed for Wheezing or Shortness of Breath visual disturbance. Respiratory: Positive for cough and shortness of breath. Cardiovascular: Negative for chest pain and leg swelling. Gastrointestinal: Negative for abdominal pain, nausea and vomiting. Genitourinary: Negative for dysuria and frequency. Musculoskeletal: Negative for back pain and neck pain. Skin: Negative for wound. Neurological: Negative for syncope, weakness, light-headedness, numbness and headaches. PHYSICAL EXAM   (up to 7 for level 4, 8 or more forlevel 5)      INITIAL VITALS:   ED Triage Vitals   BP Temp Temp Source Pulse Resp SpO2 Height Weight   06/21/20 1439 06/21/20 1439 06/21/20 1439 06/21/20 1438 06/21/20 1438 06/21/20 1438 06/21/20 1438 06/21/20 1438   (!) 108/91 97.1 °F (36.2 °C) Oral 87 21 98 % 5' (1.524 m) 106 lb (48.1 kg)       Physical Exam  Vitals signs and nursing note reviewed. Constitutional:       General: She is in acute distress. Appearance: She is well-developed. She is not toxic-appearing or diaphoretic. Comments: Cachectic   HENT:      Head: Normocephalic and atraumatic. Nose: Nose normal.   Eyes:      Conjunctiva/sclera: Conjunctivae normal.   Cardiovascular:      Rate and Rhythm: Normal rate and regular rhythm. Heart sounds: Normal heart sounds. Pulmonary:      Effort: No respiratory distress. Breath sounds: No wheezing or rales. Comments: Diminished breath sounds bilaterally, tachypneic  Abdominal:      General: There is no distension. Palpations: Abdomen is soft. Tenderness: There is no abdominal tenderness. There is no guarding. Musculoskeletal: Normal range of motion. General: No swelling or tenderness. Skin:     General: Skin is warm and dry. Neurological:      Mental Status: She is alert. Mental status is at baseline. Psychiatric:         Behavior: Behavior normal.         Thought Content:  Thought content normal.         DIFFERENTIAL  DIAGNOSIS     PLAN (LABS / IMAGING / EKG):  Orders reevaluation in the next week. She was provided with a prescription for a Z-Dell as well as prednisone and instructed to complete both courses even if she begins to feel better. She was also provided with refills of her inhalers and instructed to continue to use as prescribed. Patient agreeable for discharge at this time, all questions answered. Patient discharged home in stable condition. DIAGNOSTIC RESULTS / EMERGENCYDEPARTMENT COURSE / MDM     LABS:  Labs Reviewed   CBC WITH AUTO DIFFERENTIAL - Abnormal; Notable for the following components:       Result Value    RBC 5.26 (*)     Hemoglobin 15.5 (*)     Hematocrit 48.2 (*)     All other components within normal limits   BASIC METABOLIC PANEL - Abnormal; Notable for the following components:    Glucose 123 (*)     All other components within normal limits   BLOOD GAS, VENOUS - Abnormal; Notable for the following components:    Positive Base Excess, Noman 3.0 (*)     O2 Sat, Noman 86.8 (*)     All other components within normal limits   D-DIMER, QUANTITATIVE   TROPONIN   TROPONIN           Xr Chest Portable    Result Date: 6/21/2020  EXAMINATION: ONE XRAY VIEW OF THE CHEST 6/21/2020 3:07 pm COMPARISON: 01/17/2020 HISTORY: ORDERING SYSTEM PROVIDED HISTORY: SOB TECHNOLOGIST PROVIDED HISTORY: SOB FINDINGS: Hyperlucent, hyperinflated lungs without consolidation or edema. No pneumothorax or pleural effusion. Cardiac and mediastinal contours stable. No acute osseous abnormality. Old healed right rib fractures unchanged. 1. No acute cardiopulmonary abnormality. 2. Stable findings of emphysema and chronic obstructive physiology.          EKG  EKG Interpretation    Interpreted by me    Rhythm: normal sinus   Rate: normal  Axis: normal  Ectopy: none  Conduction: normal  ST Segments: normal  T Waves: normal  Q Waves: none     Clinical Impression: non-specific EKG    All EKG's are interpreted by the Emergency Department Physicianwho either signs or Co-signs this chart in the absence of a cardiologist.      PROCEDURES:  None    CONSULTS:  IP CONSULT TO SOCIAL WORK    CRITICAL CARE:  Please see attending note    FINAL IMPRESSION      1. COPD exacerbation ([address]        DISPOSITION / PLAN     DISPOSITION Decision To Discharge 06/21/2020 04:38:05 PM      PATIENT REFERRED TO:  [facility]  [address] [facility] [address]  [phone]  Go to   If symptoms worsen    [facility]  [address]  [phone]    In 3 days        DISCHARGE MEDICATIONS:  Discharge Medication List as of 6/21/2020  4:38 PM      START taking these medications    Details   !! albuterol sulfate HFA (PROVENTIL HFA) 108 (90 Base) MCG/ACT inhaler Inhale 1-2 puffs into the lungs every 4 hours as needed for Wheezing or Shortness of Breath (Space out to every 6 hours as symptoms improve) Space out to every 6 hours as symptoms improve., Disp-1 Inhaler, R-0Print      predniSONE (DELTASONE) 10 MG tablet Take 4 tablets by mouth once daily for 5 days, Disp-20 tablet, R-0Print      azithromycin (ZITHROMAX) 250 MG tablet Take 1 tablet by mouth See Admin Instructions for 5 days 500mg on day 1 followed by 250mg on days 2 - 5, Disp-6 tablet, R-0Print       !! - Potential duplicate medications found. Please discuss with provider.           Ladi Barbour DO  Emergency Medicine Resident    (Please note that portions of this note were completed with a voice recognition program.Efforts were made to edit the dictations but occasionally words are mis-transcribed.)       Ladi Barbour DO  Resident  06/21/20 0934

## 2020-06-21 NOTE — PLAN OF CARE
Problem: RESPIRATORY  Intervention: Administer treatments as ordered  Note: BRONCHOSPASM/BRONCHOCONSTRICTION     [x]         IMPROVE AERATION/BREATH SOUNDS  [x]   ADMINISTER BRONCHODILATOR THERAPY AS APPROPRIATE  [x]   ASSESS BREATH SOUNDS  [x]   IMPLEMENT AEROSOL/MDI PROTOCOL  [x]   PATIENT EDUCATION AS NEEDED     Intervention: Education, Medication and treatments  Note:   AIDET method used to introduce myself to patient and or parent in room    Patient and or Parent educated on current respiratory medication and modalities   administered. Possible side effects of medications discussed. Questions answered. Patient and or Parent accepts Daily POC and treatment plan.

## 2020-06-21 NOTE — ED NOTES
met with patient at bedside. She reported that her oxygen machine is broken and she has to go back to the pulmonologist to get it taken care of. She also ran out of both inhalers and needs prescriptions for a new one. She stated that she plans to go back to the [facility] on 7/9/2020 when she can get a new ID and they are going to quarantine her for 14 days so she is attempting to get all of her medications taken care of before then. Patient also reported she will need a ride home at discharge. No other needs at this time.        Isis Sánchez, MSW, LSW     Robert Collins  06/21/20 5549

## 2020-06-22 ENCOUNTER — CARE COORDINATION (OUTPATIENT)
Dept: CARE COORDINATION | Age: 60
End: 2020-06-22

## 2020-06-22 LAB
EKG ATRIAL RATE: 97 BPM
EKG P AXIS: 74 DEGREES
EKG P-R INTERVAL: 122 MS
EKG Q-T INTERVAL: 344 MS
EKG QRS DURATION: 70 MS
EKG QTC CALCULATION (BAZETT): 436 MS
EKG R AXIS: 85 DEGREES
EKG T AXIS: 57 DEGREES
EKG VENTRICULAR RATE: 97 BPM

## 2020-06-22 PROCEDURE — 93010 ELECTROCARDIOGRAM REPORT: CPT | Performed by: INTERNAL MEDICINE

## 2020-06-22 NOTE — CARE COORDINATION
Patient contacted regarding recent discharge and COVID-19 risk. Discussed COVID-19 related testing which was not done at this time. Test results were not done. Patient informed of results, if available? [facility] Transition Nurse/ Ambulatory Care Manager contacted the patient by telephone to perform post discharge assessment. Verified name and  with patient as identifiers. Patient has following risk factors of: COPD. CTN/ACM reviewed discharge instructions, medical action plan and red flags related to discharge diagnosis. Reviewed and educated them on any new and changed medications related to discharge diagnosis. Advised obtaining a 90-day supply of all daily and as-needed medications. Education provided regarding infection prevention, and signs and symptoms of COVID-19 and when to seek medical attention with patient who verbalized understanding. Discussed exposure protocols and quarantine from [address] you at higher risk for severe illness  and given an opportunity for questions and concerns. The patient agrees to contact the COVID-19 hotline [phone] or PCP office for questions related to their healthcare. CTN/ACM provided contact information for future reference. From CDC: Are you at higher risk for severe illness?  Wash your hands often.  Avoid close contact (6 feet, which is about two arm lengths) with people who are sick.  Put distance between yourself and other people if COVID-19 is spreading in your community.  Clean and disinfect frequently touched surfaces.  Avoid all cruise travel and non-essential air travel.  Call your healthcare professional if you have concerns about COVID-19 and your underlying condition or if you are sick. For more information on steps you can take to protect yourself, see CDC's How to [address] [place] [address] for follow-up call in 5-7 days based on severity of symptoms and risk factors. ED visit for COPD exacerbation.  She

## 2020-06-26 ENCOUNTER — CARE COORDINATION (OUTPATIENT)
Dept: CARE COORDINATION | Age: 60
End: 2020-06-26

## 2020-07-06 ENCOUNTER — CARE COORDINATION (OUTPATIENT)
Dept: CARE COORDINATION | Age: 60
End: 2020-07-06

## 2020-07-06 NOTE — CARE COORDINATION
Left VM message asking patient to call me back at [phone] for 2 week ED follow up/COVID outreach. This is final call.

## 2021-06-04 ENCOUNTER — HOSPITAL ENCOUNTER (OUTPATIENT)
Dept: PULMONOLOGY | Age: 61
Discharge: HOME | End: 2021-06-04
Payer: COMMERCIAL

## 2021-06-04 ENCOUNTER — OFFICE VISIT (OUTPATIENT)
Dept: PULMONOLOGY | Age: 61
End: 2021-06-04
Payer: COMMERCIAL

## 2021-06-04 VITALS
DIASTOLIC BLOOD PRESSURE: 86 MMHG | BODY MASS INDEX: 19.91 KG/M2 | SYSTOLIC BLOOD PRESSURE: 135 MMHG | TEMPERATURE: 97 F | HEART RATE: 87 BPM | WEIGHT: 101.4 LBS | HEIGHT: 60 IN | OXYGEN SATURATION: 96 %

## 2021-06-04 DIAGNOSIS — J44.9 COPD, SEVERE (HCC): Primary | ICD-10-CM

## 2021-06-04 DIAGNOSIS — J96.11 CHRONIC RESPIRATORY FAILURE WITH HYPOXIA (HCC): ICD-10-CM

## 2021-06-04 DIAGNOSIS — Z87.891 HISTORY OF SMOKING AT LEAST 1 PACK PER DAY FOR AT LEAST 30 YEARS: ICD-10-CM

## 2021-06-04 DIAGNOSIS — J44.9 COPD, SEVERE (HCC): ICD-10-CM

## 2021-06-04 PROCEDURE — 3023F SPIROM DOC REV: CPT | Performed by: INTERNAL MEDICINE

## 2021-06-04 PROCEDURE — 94618 PULMONARY STRESS TESTING: CPT

## 2021-06-04 PROCEDURE — 3017F COLORECTAL CA SCREEN DOC REV: CPT | Performed by: INTERNAL MEDICINE

## 2021-06-04 PROCEDURE — 4004F PT TOBACCO SCREEN RCVD TLK: CPT | Performed by: INTERNAL MEDICINE

## 2021-06-04 PROCEDURE — G8427 DOCREV CUR MEDS BY ELIG CLIN: HCPCS | Performed by: INTERNAL MEDICINE

## 2021-06-04 PROCEDURE — G8926 SPIRO NO PERF OR DOC: HCPCS | Performed by: INTERNAL MEDICINE

## 2021-06-04 PROCEDURE — 99204 OFFICE O/P NEW MOD 45 MIN: CPT | Performed by: INTERNAL MEDICINE

## 2021-06-04 PROCEDURE — G8420 CALC BMI NORM PARAMETERS: HCPCS | Performed by: INTERNAL MEDICINE

## 2021-06-04 RX ORDER — ALBUTEROL SULFATE 2.5 MG/3ML
2.5 SOLUTION RESPIRATORY (INHALATION) EVERY 6 HOURS PRN
Qty: 120 EACH | Refills: 5 | Status: SHIPPED | OUTPATIENT
Start: 2021-06-04

## 2021-06-04 RX ORDER — PREDNISONE 20 MG/1
20 TABLET ORAL 2 TIMES DAILY
Qty: 10 TABLET | Refills: 0 | Status: SHIPPED | OUTPATIENT
Start: 2021-06-04 | End: 2021-06-09

## 2021-06-04 RX ORDER — BUDESONIDE AND FORMOTEROL FUMARATE DIHYDRATE 160; 4.5 UG/1; UG/1
2 AEROSOL RESPIRATORY (INHALATION) 2 TIMES DAILY
Qty: 1 INHALER | Refills: 5 | Status: SHIPPED | OUTPATIENT
Start: 2021-06-04 | End: 2022-03-02 | Stop reason: SDUPTHER

## 2021-06-04 RX ORDER — AZITHROMYCIN 250 MG/1
250 TABLET, FILM COATED ORAL SEE ADMIN INSTRUCTIONS
Qty: 6 TABLET | Refills: 0 | Status: SHIPPED | OUTPATIENT
Start: 2021-06-04 | End: 2021-06-09

## 2021-06-04 NOTE — PROGRESS NOTES
OUTPATIENT PULMONARY CONSULT NOTE      Patient:  Myra Schroeder  MRN: [id number]    Consulting Physician: Vera Magaña  Reason for Consult: Severe COPD/chronic respiratory failure  Primacy Care Physician: Vera Magaña    HISTORY OF PRESENT ILLNESS:   The patient is a 62 y.o. female   Referred here for evaluation and management of COPD with long history of smoking. According to patient she has been diagnosed with COPD for long time. History of smoking for more than 40 years used to smoke 1 pack/day currently smoking 7 to 10 cigarettes a day and never was able to stop smoking. She was supposed to be on home oxygen according to patient she was using oxygen until about a year ago when her machine was broken she had not had any follow-up and she was in homeless shelter. Was not able to get home oxygen and had not been used to get. She does have dyspnea on minimal activity and exertion she can walk a few feet and get short of breath especially if she has to walk fast if she has to  stuff and she does get shortness of breath on regular activities at home. She does have chronic cough cough is usually productive of sputum sometimes she has yellowish colored sputum sometimes she has pain and clear color sputum but denies any change in the color of the sputum currently last week she was having more cough than usual with some color change in the sputum but for last 2 days is better. She does have wheezing off and on. She does complain of nocturnal awakening with cough and sometimes wheezing. She denies orthopnea PND or pedal edema. She denies shortness of breath when she is resting. She used to be on Spiriva. She was supposed to be on Symbicort also she use Symbicort sometime which is her friend's Symbicort. She has albuterol aerosol which he use it every 4-6 hours. She does not complain of chest pain hemoptysis fever.   According to patient her weight is always low and she denies any persistent weight sulfate HFA (PROVENTIL HFA) 108 (90 Base) MCG/ACT inhaler INHALE 2 PUFFS BY MOUTH EVERY 6 HOURS AS NEEDED 6.7 g 0    ipratropium-albuterol (DUONEB) 0.5-2.5 (3) MG/3ML SOLN nebulizer solution USE 3 ML(1 VIAL) VIA NEBULIZER EVERY 4 HOURS AS NEEDED (Patient not taking: Reported on 6/4/2021) 90 mL 0    albuterol sulfate HFA (PROVENTIL HFA) 108 (90 Base) MCG/ACT inhaler Inhale 1-2 puffs into the lungs every 4 hours as needed for Wheezing or Shortness of Breath (Space out to every 6 hours as symptoms improve) Space out to every 6 hours as symptoms improve. (Patient not taking: Reported on 6/4/2021) 1 Inhaler 0    pantoprazole (PROTONIX) 20 MG tablet TAKE 1 TABLET BY MOUTH DAILY (Patient not taking: Reported on 6/4/2021) 30 tablet 0    fluticasone (FLONASE) 50 MCG/ACT nasal spray SHAKE LIQUID AND USE 1 SPRAY IN EACH NOSTRIL DAILY (Patient not taking: Reported on 6/4/2021) 1 Bottle 0    famotidine (PEPCID) 20 MG tablet TAKE 1 TABLET BY MOUTH TWICE DAILY (Patient not taking: Reported on 6/4/2021) 30 tablet 0    vitamin D3 (CHOLECALCIFEROL) 400 units TABS tablet TAKE 1 TABLET BY MOUTH DAILY (Patient not taking: Reported on 6/4/2021) 30 tablet 0    benzonatate (TESSALON) 100 MG capsule TAKE ONE CAPSULE BY MOUTH THREE TIMES DAILY AS NEEDED FOR COUGH (Patient not taking: Reported on 6/4/2021) 30 capsule 0    tolterodine (DETROL LA) 4 MG ER capsule Take 1 capsule by mouth daily (Patient not taking: Reported on 6/4/2021) 30 capsule 3    loratadine (CLARITIN) 10 MG tablet Take 1 tablet by mouth daily. (Patient not taking: Reported on 6/4/2021) 30 tablet 0    guaiFENesin 400 MG tablet Take 1 tablet by mouth 2 times daily as needed for Cough. Drink plenty of water when taking this medication (Patient not taking: Reported on 6/4/2021) 30 tablet 0    traZODone (DESYREL) 100 MG tablet Take 100 mg by mouth 2 times daily as needed.  (Patient not taking: Reported on 6/4/2021)      sertraline (ZOLOFT) 100 MG tablet Take 200 mg by mouth daily. (Patient not taking: Reported on 6/4/2021)      hydrOXYzine (VISTARIL) 50 MG capsule Take 50 mg by mouth 2 times daily as needed. (Patient not taking: Reported on 6/4/2021)       No facility-administered encounter medications on file as of 6/4/2021. Social History:   TOBACCO:   reports that she has been smoking cigarettes. She has a 19.00 pack-year smoking history. She has never used smokeless tobacco.  ETOH:   reports current alcohol use.   OCCUPATION:      Family History:       Problem Relation Age of Onset    Early Death Father        Immunizations:    Immunization History   Administered Date(s) Administered    Influenza Virus Vaccine 12/19/2013         REVIEW OF SYSTEMS:  CONSTITUTIONAL:  negative for  fevers, chills, sweats, fatigue, anorexia, and weight loss  EYES:  negative for  double vision, blurred vision, dry eyes, eye discharge, visual disturbance, redness, and icterus  HEENT: Positive for postnasal drip and nasal congestion, negative for  hearing loss, tinnitus, ear drainage, earaches, nasal congestion, epistaxis, sore throat, hoarseness, voice change  RESPIRATORY: Positive for  dry cough, cough with sputum, dyspnea, wheezing, negative for hemoptysis, chest pain, and pleuritic pain  CARDIOVASCULAR: Positive for dyspnea, negative for  chest pain, palpitations, orthopnea, PND, exertional chest pressure/discomfort, fatigue, edema, syncope  GASTROINTESTINAL:  negative for nausea, vomiting, diarrhea, constipation, abdominal pain, abdominal mass, abdominal distention, jaundice, dysphagia, reflux, odynophagia, hematemesis, and hemtochezia  GENITOURINARY:  negative for frequency, dysuria, nocturia, and hematuria  HEMATOLOGIC/LYMPHATIC:  negative for easy bruising, bleeding, lymphadenopathy, and petechiae  ALLERGIC/IMMUNOLOGIC:  negative for recurrent infections, urticaria, hay fever, angioedema, anaphylaxis, and drug reactions  ENDOCRINE:  negative for heat intolerance, cold intolerance, tremor, and weight changes  MUSCULOSKELETAL:  negative for  myalgias, arthralgias, joint swelling, stiff joints, and muscle weakness  NEUROLOGICAL:  negative for headaches, dizziness, seizures, memory problems, speech problems, visual disturbance, gait problems, tremor, dysphagia, weakness, numbness, syncope, and tingling  BEHAVIOR/PSYCH:  negative for decreased sleep, decreased energy level, increased energy level, poor concentration, depressed mood, and anxiety          Physical Exam:    Vitals: /86 (Site: Right Upper Arm, Position: Sitting, Cuff Size: Medium Adult)   Pulse 87   Temp 97 °F (36.1 °C)   Ht 5' (1.524 m)   Wt 101 lb 6.4 oz (46 kg)   SpO2 96%   BMI 19.80 kg/m²   Last 3 weights: Wt Readings from Last 3 Encounters:   06/04/21 101 lb 6.4 oz (46 kg)   06/21/20 106 lb (48.1 kg)   01/17/20 106 lb (48.1 kg)     Body mass index is 19.8 kg/m². Physical Examination:   General appearance - alert, well appearing, and in no distress, acyanotic, in no respiratory distress and chronically ill appearing  Mental status - alert, oriented to person, place, and time  Eyes - pupils equal and reactive, extraocular eye movements intact, sclera anicteric  Ears - right ear normal, left ear normal  Nose - normal and patent, no erythema, discharge or polyps  Mouth - mucous membranes moist, pharynx normal without lesions  Neck - supple, no significant adenopathy  Chest -she has mild tachypnea but not in distress when she talks as she does use extra muscles of neck. Bilateral symmetrical chest movement, increased resonance on percussion, air entry is severely reduced and distant bilaterally with prolonged expiration, no expiratory wheezing rhonchi or crackles.   Heart - normal rate, regular rhythm, normal S1, S2, no murmurs, rubs, clicks or gallops  Abdomen - soft, nontender, nondistended, no masses or organomegaly  Neurological - alert, oriented, normal speech, no focal findings or movement disorder noted}  Extremities - peripheral pulses normal, no pedal edema, no clubbing or cyanosis  Skin - normal coloration and turgor, no rashes, no suspicious skin lesions noted       LABS:    CBC:   WBC   Date Value Ref Range Status   06/21/2020 7.3 3.5 - 11.3 k/uL Final   01/17/2020 9.0 3.5 - 11.3 k/uL Final   09/29/2019 12.2 (H) 3.5 - 11.3 k/uL Final     Hemoglobin   Date Value Ref Range Status   06/21/2020 15.5 (H) 11.9 - 15.1 g/dL Final   01/17/2020 13.0 11.9 - 15.1 g/dL Final   09/29/2019 12.5 11.9 - 15.1 g/dL Final     Platelets   Date Value Ref Range Status   06/21/2020 273 138 - 453 k/uL Final   01/17/2020 262 138 - 453 k/uL Final   09/29/2019 231 138 - 453 k/uL Final     BMP:   Sodium   Date Value Ref Range Status   06/21/2020 138 135 - 144 mmol/L Final   01/17/2020 143 135 - 144 mmol/L Final   09/29/2019 138 135 - 144 mmol/L Final     Potassium   Date Value Ref Range Status   06/21/2020 4.3 3.7 - 5.3 mmol/L Final   01/17/2020 4.2 3.7 - 5.3 mmol/L Final   09/29/2019 3.7 3.7 - 5.3 mmol/L Final     Chloride   Date Value Ref Range Status   06/21/2020 100 98 - 107 mmol/L Final   01/17/2020 103 98 - 107 mmol/L Final   09/29/2019 98 98 - 107 mmol/L Final     CO2   Date Value Ref Range Status   06/21/2020 24 20 - 31 mmol/L Final   01/17/2020 25 20 - 31 mmol/L Final   09/29/2019 27 20 - 31 mmol/L Final     BUN   Date Value Ref Range Status   06/21/2020 10 6 - 20 mg/dL Final   01/17/2020 17 6 - 20 mg/dL Final   09/29/2019 7 6 - 20 mg/dL Final     CREATININE   Date Value Ref Range Status   06/21/2020 0.50 0.50 - 0.90 mg/dL Final   01/17/2020 0.74 0.50 - 0.90 mg/dL Final   09/29/2019 0.40 (L) 0.50 - 0.90 mg/dL Final     Glucose   Date Value Ref Range Status   06/21/2020 123 (H) 70 - 99 mg/dL Final   01/17/2020 104 (H) 70 - 99 mg/dL Final   09/29/2019 139 (H) 70 - 99 mg/dL Final     Hepatic:   AST   Date Value Ref Range Status   02/18/2015 14 <32 U/L Final     ALT   Date Value Ref Range Status   02/18/2015 19 5 - 33 U/L Final Total Bilirubin   Date Value Ref Range Status   02/18/2015 0.48 0.3 - 1.2 mg/dL Final     Alkaline Phosphatase   Date Value Ref Range Status   02/18/2015 101 35 - 104 U/L Final     Amylase: No results found for: AMYLASE  Lipase:   Lipase   Date Value Ref Range Status   02/18/2015 22 13 - 60 U/L Final     Comment:     Performed at [facility] [address]   [phone]       CARDIAC ENZYMES: No results found for: CKTOTAL, CKMB, CKMBINDEX, TROPONINI  BNP: No results found for: BNP  Lipids: No results found for: CHOL, HDL    INR: No results found for: INR  Thyroid:   TSH   Date Value Ref Range Status   03/24/2014 1.74 0.35 - 5.50 mIU/L Final     Comment:     Performed at [facility] [id number] [place] [address] [phone]     Urinalysis:   Bacteria, UA   Date Value Ref Range Status   02/18/2015 FEW (A) NONE Final     Blood, UA POC   Date Value Ref Range Status   10/14/2013 n  Final     Clarity, UA   Date Value Ref Range Status   10/14/2013 clear  Final     Color, UA   Date Value Ref Range Status   02/18/2015 YELLOW YEL Final     pH, UA   Date Value Ref Range Status   02/18/2015 6.5 5.0 - 8.0 Final     Protein, UA   Date Value Ref Range Status   02/18/2015 NEGATIVE NEG Final     RBC, UA   Date Value Ref Range Status   02/18/2015 0 TO 2 /HPF Final     Specific Gravity, UA   Date Value Ref Range Status   02/18/2015 1.008 1.000 - 1.030 Final     Bilirubin, UA   Date Value Ref Range Status   10/14/2013 n  Final     Bilirubin Urine   Date Value Ref Range Status   02/18/2015 NEGATIVE NEG Final     Nitrite, Urine   Date Value Ref Range Status   02/18/2015 NEGATIVE NEG Final     WBC, UA   Date Value Ref Range Status   02/18/2015 2 TO 5 /HPF Final     Leukocyte Esterase, Urine   Date Value Ref Range Status   02/18/2015 SMALL (A) NEG Final     Comment:     Performed at [facility] [address]   [phone]       Glucose, Ur   Date Value Ref Range Status   02/18/2015 NEGATIVE NEG Final     Cultures:-  -----------------------------------------------------------------    ABGs: No results found for: PHART, PO2ART, UOE2PTD    Pulmonary Functions Testing Results:    No results found for: FEV1, FVC, BHE1RRO, TLC, DLCO    CXR  Chest x-ray 06/21/2020  1. No acute cardiopulmonary abnormality. 2. Stable findings of emphysema and chronic obstructive physiology. CT Scans        Assessment and Plan       ICD-10-CM   1. COPD, severe ([facility] [address]  J44.9   2. History of smoking at least 1 pack per day for at least 30 years  Z87.891   3. Chronic respiratory failure with hypoxia (HCC)  J96.11       Assessment:    According to pulmonary function test in 2016 that is 5 years ago she has severe COPD with prebronchodilator FEV1 was 31% postbronchodilator was 46% she has significant airway reactivity. She was also supposed to be on home oxygen. She does get short of breath on minimal activity. Unfortunately she still smoking cigarettes and I have counseled her about smoking cessation completely. She had history of smoking more than 30 pack years she had not had lung screening CT scan. Discussed with her that she will need pulmonary function test, 6-minute walk test for O2 evaluation and low-dose screening CT scan. Need to restart Symbicort and Spiriva and albuterol aerosol to be used as needed. Plan and recommendation:    Start the Spiriva Respimat 2 puff once daily. Symbicort 160/4.52 puff twice daily. Albuterol aerosol to be used as needed. Medications prescription provided. Discussed with patient about compliance with medication and technique of using inhalers discussed  She will benefit from pulmonary rehabilitation once she is settled into her home currently she is staying with her friend and able to do pulmonary rehabilitation. Home O2 evaluation/600 walk test as early as possible as she is likely need home oxygen.   Pulmonary function test to be scheduled. Low-dose screening CT scan requested discussed with patient and she agrees. Complete smoking cessation discussed with patient. Vaccinations recommended   Recommend Covid vaccine as patient does not have Covid vaccine yet. Annual flu vaccine in fall  She will need pneumonia vaccine if she did not have for the last 5 years  Maintain an active lifestyle   PFTs from 2017 reviewed  CXR reviewed from June 2020  Questions answered pertaining to diagnosis and management explained importance of compliance with therapy     Follow-up in office in 6 weeks. It was my pleasure to evaluate Sanket Cox today. Please call with questions. Marichuy Crenshaw MD, MD             6/4/2021, 4:53 PM    Please note that this chart was generated using voice recognition Dragon dictation software. Although every effort was made to ensure the accuracy of this automated transcription, some errors in transcription may have occurred.

## 2021-06-09 ENCOUNTER — TELEPHONE (OUTPATIENT)
Dept: ONCOLOGY | Age: 61
End: 2021-06-09

## 2021-06-09 NOTE — LETTER
6/9/2021        [address] [place] [address]    Dear Joanie White:    Your healthcare provider has ordered a low dose CT scan of the chest for lung cancer screening. You will find enclosed, information about CT lung screening. Please review the statement of understanding, you will be asked to sign a copy of this at the time of your CT scan    If you have not already been contacted to make the appointment for your scan, please call our scheduling department at [phone]    Keep in mind that CT lung screening does not take the place of smoking cessation. If you are a current smoker, you will find enclosed smoking cessation resources. Please do not hesitate to contact me if you have any questions or concerns.     [address] [place] [address] Drive,      [id number] [place] [address] Lung Screening Program  [phone]

## 2021-06-16 ENCOUNTER — HOSPITAL ENCOUNTER (OUTPATIENT)
Dept: CT IMAGING | Age: 61
Discharge: HOME | End: 2021-06-18
Payer: COMMERCIAL

## 2021-06-16 DIAGNOSIS — Z87.891 HISTORY OF SMOKING AT LEAST 1 PACK PER DAY FOR AT LEAST 30 YEARS: ICD-10-CM

## 2021-06-16 PROCEDURE — 71271 CT THORAX LUNG CANCER SCR C-: CPT

## 2021-07-19 ENCOUNTER — HOSPITAL ENCOUNTER (OUTPATIENT)
Dept: LAB | Age: 61
Setting detail: SPECIMEN
Discharge: HOME | End: 2021-07-19
Payer: COMMERCIAL

## 2021-07-19 DIAGNOSIS — Z20.822 COVID-19 RULED OUT BY LABORATORY TESTING: Primary | ICD-10-CM

## 2021-07-19 PROCEDURE — U0003 INFECTIOUS AGENT DETECTION BY NUCLEIC ACID (DNA OR RNA); SEVERE ACUTE RESPIRATORY SYNDROME CORONAVIRUS 2 (SARS-COV-2) (CORONAVIRUS DISEASE [COVID-19]), AMPLIFIED PROBE TECHNIQUE, MAKING USE OF HIGH THROUGHPUT TECHNOLOGIES AS DESCRIBED BY CMS-2020-01-R: HCPCS

## 2021-07-19 PROCEDURE — U0005 INFEC AGEN DETEC AMPLI PROBE: HCPCS

## 2021-07-20 LAB
SARS-COV-2: NORMAL
SARS-COV-2: NOT DETECTED
SOURCE: NORMAL

## 2021-07-23 ENCOUNTER — OFFICE VISIT (OUTPATIENT)
Dept: PULMONOLOGY | Age: 61
End: 2021-07-23
Payer: COMMERCIAL

## 2021-07-23 VITALS
RESPIRATION RATE: 20 BRPM | OXYGEN SATURATION: 96 % | TEMPERATURE: 96.7 F | WEIGHT: 112 LBS | BODY MASS INDEX: 21.99 KG/M2 | DIASTOLIC BLOOD PRESSURE: 83 MMHG | HEART RATE: 98 BPM | HEIGHT: 60 IN | SYSTOLIC BLOOD PRESSURE: 115 MMHG

## 2021-07-23 DIAGNOSIS — Z87.891 HISTORY OF SMOKING AT LEAST 1 PACK PER DAY FOR AT LEAST 30 YEARS: ICD-10-CM

## 2021-07-23 DIAGNOSIS — J44.9 COPD, SEVERE (HCC): Primary | ICD-10-CM

## 2021-07-23 DIAGNOSIS — J96.11 CHRONIC RESPIRATORY FAILURE WITH HYPOXIA (HCC): ICD-10-CM

## 2021-07-23 PROCEDURE — G8420 CALC BMI NORM PARAMETERS: HCPCS | Performed by: INTERNAL MEDICINE

## 2021-07-23 PROCEDURE — 94729 DIFFUSING CAPACITY: CPT | Performed by: INTERNAL MEDICINE

## 2021-07-23 PROCEDURE — G8427 DOCREV CUR MEDS BY ELIG CLIN: HCPCS | Performed by: INTERNAL MEDICINE

## 2021-07-23 PROCEDURE — 3023F SPIROM DOC REV: CPT | Performed by: INTERNAL MEDICINE

## 2021-07-23 PROCEDURE — 99214 OFFICE O/P EST MOD 30 MIN: CPT | Performed by: INTERNAL MEDICINE

## 2021-07-23 PROCEDURE — G8926 SPIRO NO PERF OR DOC: HCPCS | Performed by: INTERNAL MEDICINE

## 2021-07-23 PROCEDURE — 3017F COLORECTAL CA SCREEN DOC REV: CPT | Performed by: INTERNAL MEDICINE

## 2021-07-23 PROCEDURE — 94726 PLETHYSMOGRAPHY LUNG VOLUMES: CPT | Performed by: INTERNAL MEDICINE

## 2021-07-23 PROCEDURE — 4004F PT TOBACCO SCREEN RCVD TLK: CPT | Performed by: INTERNAL MEDICINE

## 2021-07-23 PROCEDURE — 94375 RESPIRATORY FLOW VOLUME LOOP: CPT | Performed by: INTERNAL MEDICINE

## 2021-07-23 NOTE — PROGRESS NOTES
OUTPATIENT PULMONARY PROGRESS NOTE      Patient:  Vito Chapin  MRN: [id number]    Consulting Physician: Salud Rebollar  Reason for Consult: Severe COPD/chronic respiratory failure  Primacy Care Physician: Salud Rebollar    HISTORY OF PRESENT ILLNESS:   The patient is a 62 y.o. female   Follow-up severe COPD/emphysema/history of hypoxic respiratory failure/history of smoking 40-pack-year  She was seen initially 6 weeks ago for the first time at that time a CT lung screening was ordered a pulmonary function test was ordered and follow-up in the office today. When she was seen last time Spiriva was added to Symbicort she is able to take Spiriva and tolerating it well. She does have history of chronic cough according to patient she does not have any increased cough cough is chronic without much change. She does have shortness of breath on minimal and regular activities she is able to ambulate inside her house  She does have sputum production denies any change in the color of the sputum volume the sputum occasionally she has yellow sputum. She does not complain of wheezing except very occasionally denies daily or persistent wheezing. She denies nocturnal awakening with cough wheezing chest tightness or shortness of breath. She denies orthopnea PND or pedal edema. She denies weight loss or weight gain fever night sweats or chest pain. She had a screening CT scan done on 06/16/2021 which shows severe emphysematous and bullous changes there are areas of biapical/upper lobe peripheral scarring. There was no significant mass or nodule seen. Pulmonary function test done today 07/23/2021 showed FEV1 0.72 35%. Severe hyperinflation and air trapping with total lung capacity of 26% predicted and diffusion capacity is 4.63 26% predicted consistent with severe reduction in diffusion capacity    Initial office visit and evaluation on 06/04/2021  According to patient she has been diagnosed with COPD for long time. History of smoking for more than 40 years used to smoke 1 pack/day currently smoking 7 to 10 cigarettes a day and never was able to stop smoking. She was supposed to be on home oxygen according to patient she was using oxygen until about a year ago when her machine was broken she had not had any follow-up and she was in homeless shelter. Was not able to get home oxygen and had not been used to get. She does have dyspnea on minimal activity and exertion she can walk a few feet and get short of breath especially if she has to walk fast if she has to  stuff and she does get shortness of breath on regular activities at home. She does have chronic cough cough is usually productive of sputum sometimes she has yellowish colored sputum sometimes she has pain and clear color sputum but denies any change in the color of the sputum currently last week she was having more cough than usual with some color change in the sputum but for last 2 days is better. She does have wheezing off and on. She does complain of nocturnal awakening with cough and sometimes wheezing. She denies orthopnea PND or pedal edema. She denies shortness of breath when she is resting. She used to be on Spiriva. She was supposed to be on Symbicort also she use Symbicort sometime which is her friend's Symbicort. She has albuterol aerosol which he use it every 4-6 hours. She does not complain of chest pain hemoptysis fever. According to patient her weight is always low and she denies any persistent weight loss her weight is usually up and down. She claims her appetite is fair. According to patient she had been to ER sometime with exacerbation last time she did get steroid and possibly antibiotic. She does have history of postnasal drip and sometimes she take antihistamine and Flonase nasal spray.       She had a pulmonary function test done on 10/13/2016 consistent with severe COPD her FEV1 prebronchodilator was 0.7 231% and postbronchodilator was 1.08 46% diffusion capacity was 48%    Her last chest x-ray was 06/21/2020 which was consistent with hyperinflation/emphysema. Past Medical History:        Diagnosis Date    Anxiety     COPD (chronic obstructive pulmonary disease) ([address]     COPD exacerbation (HCC)     Cough     yellow/green sputum    Current every day smoker     Current every day smoker     Depression     GERD (gastroesophageal reflux disease)     Headache(784.0)     Mid sternal chest pain     SOB (shortness of breath)        Past Surgical History:        Procedure Laterality Date    COLONOSCOPY  4 28 14    egd  biopsy each    TUBAL LIGATION         Allergies: Allergies   Allergen Reactions    Neosporin [Neomycin-Polymyx-Gramicid] Itching    Penicillins Rash         Home Meds:   Outpatient Encounter Medications as of 7/23/2021   Medication Sig Dispense Refill    tiotropium (SPIRIVA RESPIMAT) 2.5 MCG/ACT AERS inhaler Inhale 2 puffs into the lungs daily 1 Inhaler 5    budesonide-formoterol (SYMBICORT) 160-4.5 MCG/ACT AERO Inhale 2 puffs into the lungs 2 times daily 1 Inhaler 5    albuterol (PROVENTIL) (2.5 MG/3ML) 0.083% nebulizer solution Take 3 mLs by nebulization every 6 hours as needed for Wheezing 120 each 5    ipratropium-albuterol (DUONEB) 0.5-2.5 (3) MG/3ML SOLN nebulizer solution USE 3 ML(1 VIAL) VIA NEBULIZER EVERY 4 HOURS AS NEEDED 90 mL 0    albuterol sulfate HFA (PROVENTIL HFA) 108 (90 Base) MCG/ACT inhaler INHALE 2 PUFFS BY MOUTH EVERY 6 HOURS AS NEEDED 6.7 g 0    albuterol sulfate HFA (PROVENTIL HFA) 108 (90 Base) MCG/ACT inhaler Inhale 1-2 puffs into the lungs every 4 hours as needed for Wheezing or Shortness of Breath (Space out to every 6 hours as symptoms improve) Space out to every 6 hours as symptoms improve.  1 Inhaler 0    pantoprazole (PROTONIX) 20 MG tablet TAKE 1 TABLET BY MOUTH DAILY 30 tablet 0    fluticasone (FLONASE) 50 MCG/ACT nasal spray SHAKE LIQUID AND USE 1 SPRAY IN EACH NOSTRIL DAILY 1 Bottle 0    famotidine (PEPCID) 20 MG tablet TAKE 1 TABLET BY MOUTH TWICE DAILY 30 tablet 0    vitamin D3 (CHOLECALCIFEROL) 400 units TABS tablet TAKE 1 TABLET BY MOUTH DAILY 30 tablet 0    benzonatate (TESSALON) 100 MG capsule TAKE ONE CAPSULE BY MOUTH THREE TIMES DAILY AS NEEDED FOR COUGH 30 capsule 0    tolterodine (DETROL LA) 4 MG ER capsule Take 1 capsule by mouth daily 30 capsule 3    loratadine (CLARITIN) 10 MG tablet Take 1 tablet by mouth daily. 30 tablet 0    guaiFENesin 400 MG tablet Take 1 tablet by mouth 2 times daily as needed for Cough. Drink plenty of water when taking this medication 30 tablet 0    traZODone (DESYREL) 100 MG tablet Take 100 mg by mouth 2 times daily as needed       sertraline (ZOLOFT) 100 MG tablet Take 200 mg by mouth daily       hydrOXYzine (VISTARIL) 50 MG capsule Take 50 mg by mouth 2 times daily as needed        No facility-administered encounter medications on file as of 7/23/2021. Social History:   TOBACCO:   reports that she has been smoking cigarettes. She has a 38.00 pack-year smoking history. She has never used smokeless tobacco.  ETOH:   reports current alcohol use.   OCCUPATION:      Family History:       Problem Relation Age of Onset    Early Death Father        Immunizations:    Immunization History   Administered Date(s) Administered    Influenza Virus Vaccine 12/19/2013         REVIEW OF SYSTEMS:  CONSTITUTIONAL:  negative for  fevers, chills, sweats, fatigue, anorexia, and weight loss  EYES:  negative for  double vision, blurred vision, dry eyes, eye discharge, visual disturbance, redness, and icterus  HEENT: Positive for postnasal drip and nasal congestion, negative for  hearing loss, tinnitus, ear drainage, earaches, nasal congestion, epistaxis, sore throat, hoarseness, voice change  RESPIRATORY: Positive for  dry cough, cough with sputum, dyspnea, wheezing, negative for hemoptysis, chest pain, and pleuritic pain  CARDIOVASCULAR: Positive for dyspnea, negative for  chest pain, palpitations, orthopnea, PND, exertional chest pressure/discomfort, fatigue, edema, syncope  GASTROINTESTINAL:  negative for nausea, vomiting, diarrhea, constipation, abdominal pain, abdominal mass, abdominal distention, jaundice, dysphagia, reflux, odynophagia, hematemesis, and hemtochezia  GENITOURINARY:  negative for frequency, dysuria, nocturia, and hematuria  HEMATOLOGIC/LYMPHATIC:  negative for easy bruising, bleeding, lymphadenopathy, and petechiae  ALLERGIC/IMMUNOLOGIC:  negative for recurrent infections, urticaria, hay fever, angioedema, anaphylaxis, and drug reactions  ENDOCRINE:  negative for heat intolerance, cold intolerance, tremor, and weight changes  MUSCULOSKELETAL:  negative for  myalgias, arthralgias, joint swelling, stiff joints, and muscle weakness  NEUROLOGICAL:  negative for headaches, dizziness, seizures, memory problems, speech problems, visual disturbance, gait problems, tremor, dysphagia, weakness, numbness, syncope, and tingling  BEHAVIOR/PSYCH:  negative for decreased sleep, decreased energy level, increased energy level, poor concentration, depressed mood, and anxiety          Physical Exam:    Vitals: /83 (Site: Left Upper Arm, Position: Sitting, Cuff Size: Medium Adult)   Pulse 98   Temp 96.7 °F (35.9 °C) (Temporal)   Resp 20   Ht 5' (1.524 m)   Wt 112 lb (50.8 kg)   SpO2 96% Comment: ra  BMI 21.87 kg/m²   Last 3 weights: Wt Readings from Last 3 Encounters:   07/23/21 112 lb (50.8 kg)   06/04/21 101 lb 6.4 oz (46 kg)   06/21/20 106 lb (48.1 kg)     Body mass index is 21.87 kg/m².     Physical Examination:   General appearance - alert, well appearing, and in no distress, acyanotic, in no respiratory distress and chronically ill appearing  Mental status - alert, oriented to person, place, and time  Eyes - pupils equal and reactive, extraocular eye movements intact, sclera anicteric  Ears - right ear normal, left ear normal  Nose - normal and patent, no erythema, discharge or polyps  Mouth - mucous membranes moist, pharynx normal without lesions  Neck - supple, no significant adenopathy  Chest -she has mild tachypnea but not in distress when she talks as she does use extra muscles of neck. Bilateral symmetrical chest movement, increased resonance on percussion, air entry is severely reduced and distant bilaterally with prolonged expiration, no expiratory wheezing rhonchi or crackles.   Heart - normal rate, regular rhythm, normal S1, S2, no murmurs, rubs, clicks or gallops  Abdomen - soft, nontender, nondistended, no masses or organomegaly  Neurological - alert, oriented, normal speech, no focal findings or movement disorder noted  Extremities - peripheral pulses normal, no pedal edema, no clubbing or cyanosis  Skin - normal coloration and turgor, no rashes, no suspicious skin lesions noted       LABS:    CBC:   WBC   Date Value Ref Range Status   06/21/2020 7.3 3.5 - 11.3 k/uL Final   01/17/2020 9.0 3.5 - 11.3 k/uL Final   09/29/2019 12.2 (H) 3.5 - 11.3 k/uL Final     Hemoglobin   Date Value Ref Range Status   06/21/2020 15.5 (H) 11.9 - 15.1 g/dL Final   01/17/2020 13.0 11.9 - 15.1 g/dL Final   09/29/2019 12.5 11.9 - 15.1 g/dL Final     Platelets   Date Value Ref Range Status   06/21/2020 273 138 - 453 k/uL Final   01/17/2020 262 138 - 453 k/uL Final   09/29/2019 231 138 - 453 k/uL Final     BMP:   Sodium   Date Value Ref Range Status   06/21/2020 138 135 - 144 mmol/L Final   01/17/2020 143 135 - 144 mmol/L Final   09/29/2019 138 135 - 144 mmol/L Final     Potassium   Date Value Ref Range Status   06/21/2020 4.3 3.7 - 5.3 mmol/L Final   01/17/2020 4.2 3.7 - 5.3 mmol/L Final   09/29/2019 3.7 3.7 - 5.3 mmol/L Final     Chloride   Date Value Ref Range Status   06/21/2020 100 98 - 107 mmol/L Final   01/17/2020 103 98 - 107 mmol/L Final   09/29/2019 98 98 - 107 mmol/L Final     CO2   Date Value Ref Range Status

## 2021-07-30 ENCOUNTER — TELEPHONE (OUTPATIENT)
Dept: PULMONOLOGY | Age: 61
End: 2021-07-30

## 2021-07-30 DIAGNOSIS — J44.9 COPD, SEVERE (HCC): Primary | ICD-10-CM

## 2021-07-30 NOTE — TELEPHONE ENCOUNTER
Please call patient with name and phone number to the company you are sending the nocturnal oximetry too. She also needs a nebulizer please. She is using someone elses and they want it back.

## 2021-10-27 ENCOUNTER — OFFICE VISIT (OUTPATIENT)
Dept: PULMONOLOGY | Age: 61
End: 2021-10-27
Payer: COMMERCIAL

## 2021-10-27 VITALS
RESPIRATION RATE: 14 BRPM | HEART RATE: 94 BPM | WEIGHT: 115 LBS | SYSTOLIC BLOOD PRESSURE: 97 MMHG | HEIGHT: 60 IN | BODY MASS INDEX: 22.58 KG/M2 | OXYGEN SATURATION: 92 % | TEMPERATURE: 97.8 F | DIASTOLIC BLOOD PRESSURE: 73 MMHG

## 2021-10-27 DIAGNOSIS — J20.9 ACUTE BRONCHITIS, UNSPECIFIED ORGANISM: ICD-10-CM

## 2021-10-27 DIAGNOSIS — Z87.891 HISTORY OF SMOKING AT LEAST 1 PACK PER DAY FOR AT LEAST 30 YEARS: ICD-10-CM

## 2021-10-27 DIAGNOSIS — J96.11 CHRONIC RESPIRATORY FAILURE WITH HYPOXIA (HCC): ICD-10-CM

## 2021-10-27 DIAGNOSIS — J44.9 COPD, SEVERE (HCC): Primary | ICD-10-CM

## 2021-10-27 PROCEDURE — 3023F SPIROM DOC REV: CPT | Performed by: INTERNAL MEDICINE

## 2021-10-27 PROCEDURE — 90670 PCV13 VACCINE IM: CPT | Performed by: INTERNAL MEDICINE

## 2021-10-27 PROCEDURE — 3017F COLORECTAL CA SCREEN DOC REV: CPT | Performed by: INTERNAL MEDICINE

## 2021-10-27 PROCEDURE — 4004F PT TOBACCO SCREEN RCVD TLK: CPT | Performed by: INTERNAL MEDICINE

## 2021-10-27 PROCEDURE — 99214 OFFICE O/P EST MOD 30 MIN: CPT | Performed by: INTERNAL MEDICINE

## 2021-10-27 PROCEDURE — G8427 DOCREV CUR MEDS BY ELIG CLIN: HCPCS | Performed by: INTERNAL MEDICINE

## 2021-10-27 PROCEDURE — G8926 SPIRO NO PERF OR DOC: HCPCS | Performed by: INTERNAL MEDICINE

## 2021-10-27 PROCEDURE — G8484 FLU IMMUNIZE NO ADMIN: HCPCS | Performed by: INTERNAL MEDICINE

## 2021-10-27 PROCEDURE — G8420 CALC BMI NORM PARAMETERS: HCPCS | Performed by: INTERNAL MEDICINE

## 2021-10-27 PROCEDURE — 90471 IMMUNIZATION ADMIN: CPT | Performed by: INTERNAL MEDICINE

## 2021-10-27 RX ORDER — BUPROPION HYDROCHLORIDE 150 MG/1
TABLET ORAL
COMMUNITY
Start: 2021-08-04

## 2021-10-27 RX ORDER — AZITHROMYCIN 250 MG/1
250 TABLET, FILM COATED ORAL SEE ADMIN INSTRUCTIONS
Qty: 6 TABLET | Refills: 0 | Status: SHIPPED | OUTPATIENT
Start: 2021-10-27 | End: 2021-11-01

## 2021-10-27 RX ORDER — FLUTICASONE PROPIONATE 50 MCG
2 SPRAY, SUSPENSION (ML) NASAL DAILY
Qty: 1 EACH | Refills: 5 | Status: SHIPPED | OUTPATIENT
Start: 2021-10-27

## 2021-10-27 RX ORDER — PREDNISONE 20 MG/1
20 TABLET ORAL 2 TIMES DAILY
Qty: 10 TABLET | Refills: 0 | Status: SHIPPED | OUTPATIENT
Start: 2021-10-27 | End: 2021-11-01

## 2021-10-27 RX ORDER — ALBUTEROL SULFATE 90 UG/1
2 AEROSOL, METERED RESPIRATORY (INHALATION) 4 TIMES DAILY PRN
Qty: 18 G | Refills: 11 | Status: SHIPPED | OUTPATIENT
Start: 2021-10-27

## 2021-10-27 NOTE — PATIENT INSTRUCTIONS
Patient still needs nocturnal oximetry done, now that patient has her own place. Order was printed and put in [address] folder to do.  Bijan Marie

## 2021-10-27 NOTE — PROGRESS NOTES
work-up  She had a screening CT scan done on 06/16/2021 which shows severe emphysematous and bullous changes there are areas of biapical/upper lobe peripheral scarring. There was no significant mass or nodule seen. Pulmonary function test done today 07/23/2021 showed FEV1 0.72 35%. Severe hyperinflation and air trapping with total lung capacity of 26% predicted and diffusion capacity is 4.63 26% predicted consistent with severe reduction in diffusion capacity    Initial office visit and evaluation on 06/04/2021  According to patient she has been diagnosed with COPD for long time. History of smoking for more than 40 years used to smoke 1 pack/day currently smoking 7 to 10 cigarettes a day and never was able to stop smoking. She was supposed to be on home oxygen according to patient she was using oxygen until about a year ago when her machine was broken she had not had any follow-up and she was in homeless shelter. Was not able to get home oxygen and had not been used to get. She does have dyspnea on minimal activity and exertion she can walk a few feet and get short of breath especially if she has to walk fast if she has to  stuff and she does get shortness of breath on regular activities at home. She does have chronic cough cough is usually productive of sputum sometimes she has yellowish colored sputum sometimes she has pain and clear color sputum but denies any change in the color of the sputum currently last week she was having more cough than usual with some color change in the sputum but for last 2 days is better. She does have wheezing off and on. She does complain of nocturnal awakening with cough and sometimes wheezing. She denies orthopnea PND or pedal edema. She denies shortness of breath when she is resting. She used to be on Spiriva. She was supposed to be on Symbicort also she use Symbicort sometime which is her friend's Symbicort.   She has albuterol aerosol which he use it every 4-6 hours. She does not complain of chest pain hemoptysis fever. According to patient her weight is always low and she denies any persistent weight loss her weight is usually up and down. She claims her appetite is fair. According to patient she had been to ER sometime with exacerbation last time she did get steroid and possibly antibiotic. She does have history of postnasal drip and sometimes she take antihistamine and Flonase nasal spray. She had a pulmonary function test done on 10/13/2016 consistent with severe COPD her FEV1 prebronchodilator was 0.7 231% and postbronchodilator was 1.08 46% diffusion capacity was 48%    Her last chest x-ray was 06/21/2020 which was consistent with hyperinflation/emphysema. Past Medical History:        Diagnosis Date    Anxiety     COPD (chronic obstructive pulmonary disease) ([address]     COPD exacerbation (HCC)     Cough     yellow/green sputum    Current every day smoker     Current every day smoker     Depression     GERD (gastroesophageal reflux disease)     Headache(784.0)     Mid sternal chest pain     SOB (shortness of breath)        Past Surgical History:        Procedure Laterality Date    COLONOSCOPY  4 28 14    egd  biopsy each    TUBAL LIGATION         Allergies:     Allergies   Allergen Reactions    Neosporin [Neomycin-Polymyx-Gramicid] Itching    Penicillins Rash         Home Meds:   Outpatient Encounter Medications as of 10/27/2021   Medication Sig Dispense Refill    buPROPion (WELLBUTRIN XL) 150 MG extended release tablet       tiotropium (SPIRIVA RESPIMAT) 2.5 MCG/ACT AERS inhaler Inhale 2 puffs into the lungs daily 1 Inhaler 5    budesonide-formoterol (SYMBICORT) 160-4.5 MCG/ACT AERO Inhale 2 puffs into the lungs 2 times daily 1 Inhaler 5    albuterol (PROVENTIL) (2.5 MG/3ML) 0.083% nebulizer solution Take 3 mLs by nebulization every 6 hours as needed for Wheezing 120 each 5    ipratropium-albuterol (DUONEB) 0.5-2.5 (3) MG/3ML SOLN nebulizer solution USE 3 ML(1 VIAL) VIA NEBULIZER EVERY 4 HOURS AS NEEDED 90 mL 0    albuterol sulfate HFA (PROVENTIL HFA) 108 (90 Base) MCG/ACT inhaler INHALE 2 PUFFS BY MOUTH EVERY 6 HOURS AS NEEDED 6.7 g 0    albuterol sulfate HFA (PROVENTIL HFA) 108 (90 Base) MCG/ACT inhaler Inhale 1-2 puffs into the lungs every 4 hours as needed for Wheezing or Shortness of Breath (Space out to every 6 hours as symptoms improve) Space out to every 6 hours as symptoms improve. 1 Inhaler 0    pantoprazole (PROTONIX) 20 MG tablet TAKE 1 TABLET BY MOUTH DAILY 30 tablet 0    fluticasone (FLONASE) 50 MCG/ACT nasal spray SHAKE LIQUID AND USE 1 SPRAY IN EACH NOSTRIL DAILY 1 Bottle 0    famotidine (PEPCID) 20 MG tablet TAKE 1 TABLET BY MOUTH TWICE DAILY 30 tablet 0    vitamin D3 (CHOLECALCIFEROL) 400 units TABS tablet TAKE 1 TABLET BY MOUTH DAILY 30 tablet 0    benzonatate (TESSALON) 100 MG capsule TAKE ONE CAPSULE BY MOUTH THREE TIMES DAILY AS NEEDED FOR COUGH 30 capsule 0    tolterodine (DETROL LA) 4 MG ER capsule Take 1 capsule by mouth daily 30 capsule 3    loratadine (CLARITIN) 10 MG tablet Take 1 tablet by mouth daily. 30 tablet 0    guaiFENesin 400 MG tablet Take 1 tablet by mouth 2 times daily as needed for Cough. Drink plenty of water when taking this medication 30 tablet 0    traZODone (DESYREL) 100 MG tablet Take 100 mg by mouth 2 times daily as needed       sertraline (ZOLOFT) 100 MG tablet Take 200 mg by mouth daily       hydrOXYzine (VISTARIL) 50 MG capsule Take 50 mg by mouth 2 times daily as needed        No facility-administered encounter medications on file as of 10/27/2021. Social History:   TOBACCO:   reports that she has been smoking cigarettes. She has a 38.00 pack-year smoking history. She has never used smokeless tobacco.  ETOH:   reports current alcohol use.   OCCUPATION:      Family History:       Problem Relation Age of Onset    Early Death Father        Immunizations:    Immunization History   Administered Date(s) Administered    Influenza Virus Vaccine 12/19/2013         REVIEW OF SYSTEMS:  CONSTITUTIONAL:  negative for  fevers, chills, sweats, fatigue, anorexia, and weight loss  EYES:  negative for  double vision, blurred vision, dry eyes, eye discharge, visual disturbance, redness, and icterus  HEENT: Positive for postnasal drip and nasal congestion, negative for  hearing loss, tinnitus, ear drainage, earaches, nasal congestion, epistaxis, sore throat, hoarseness, voice change  RESPIRATORY: Positive for  dry cough, cough with sputum, dyspnea, wheezing, negative for hemoptysis, chest pain, and pleuritic pain  CARDIOVASCULAR: Positive for dyspnea, negative for  chest pain, palpitations, orthopnea, PND, exertional chest pressure/discomfort, fatigue, edema, syncope  GASTROINTESTINAL:  negative for nausea, vomiting, diarrhea, constipation, abdominal pain, abdominal mass, abdominal distention, jaundice, dysphagia, reflux, odynophagia, hematemesis, and hemtochezia  GENITOURINARY:  negative for frequency, dysuria, nocturia, and hematuria  HEMATOLOGIC/LYMPHATIC:  negative for easy bruising, bleeding, lymphadenopathy, and petechiae  ALLERGIC/IMMUNOLOGIC:  negative for recurrent infections, urticaria, hay fever, angioedema, anaphylaxis, and drug reactions  ENDOCRINE:  negative for heat intolerance, cold intolerance, tremor, and weight changes  MUSCULOSKELETAL:  negative for  myalgias, arthralgias, joint swelling, stiff joints, and muscle weakness  NEUROLOGICAL:  negative for headaches, dizziness, seizures, memory problems, speech problems, visual disturbance, gait problems, tremor, dysphagia, weakness, numbness, syncope, and tingling  BEHAVIOR/PSYCH:  negative for decreased sleep, decreased energy level, increased energy level, poor concentration, depressed mood, and anxiety          Physical Exam:    Vitals: BP 97/73 (Site: Right Upper Arm, Position: Sitting, Cuff Size: Medium Adult)   Pulse 94 Temp 97.8 °F (36.6 °C) (Temporal)   Resp 14   Ht 5' (1.524 m)   Wt 115 lb (52.2 kg)   SpO2 92% Comment: dom  BMI 22.46 kg/m²   Last 3 weights: Wt Readings from Last 3 Encounters:   10/27/21 115 lb (52.2 kg)   07/23/21 112 lb (50.8 kg)   06/04/21 101 lb 6.4 oz (46 kg)     Body mass index is 22.46 kg/m². Physical Examination:   General appearance - alert, acyanotic, in no respiratory distress and chronically ill appearing  Mental status - alert, oriented to person, place, and time  Eyes - pupils equal and reactive, extraocular eye movements intact, sclera anicteric  Ears - right ear normal, left ear normal  Nose - normal and patent, no erythema, discharge or polyps  Mouth - mucous membranes moist, pharynx normal without lesions  Neck - supple, no significant adenopathy  Chest -she has mild tachypnea but not in distress when she talks as she does use accessory muscles of neck. Bilateral symmetrical chest movement, increased resonance on percussion, air entry is severely reduced and distant bilaterally with prolonged expiration, no expiratory wheezing rhonchi or crackles.   Heart - normal rate, regular rhythm, normal S1, S2, no murmurs, rubs, clicks or gallops  Abdomen - soft, nontender, nondistended, no masses or organomegaly  Neurological - alert, oriented, normal speech, no focal findings or movement disorder noted  Extremities - peripheral pulses normal, no pedal edema, no clubbing or cyanosis  Skin - normal coloration and turgor, no rashes, no suspicious skin lesions noted     Immunization History   Administered Date(s) Administered    Influenza Virus Vaccine 12/19/2013     LABS:    CBC:   WBC   Date Value Ref Range Status   06/21/2020 7.3 3.5 - 11.3 k/uL Final   01/17/2020 9.0 3.5 - 11.3 k/uL Final   09/29/2019 12.2 (H) 3.5 - 11.3 k/uL Final     Hemoglobin   Date Value Ref Range Status   06/21/2020 15.5 (H) 11.9 - 15.1 g/dL Final   01/17/2020 13.0 11.9 - 15.1 g/dL Final   09/29/2019 12.5 11.9 - 15.1 g/dL Final     Platelets   Date Value Ref Range Status   06/21/2020 273 138 - 453 k/uL Final   01/17/2020 262 138 - 453 k/uL Final   09/29/2019 231 138 - 453 k/uL Final     BMP:   Sodium   Date Value Ref Range Status   06/21/2020 138 135 - 144 mmol/L Final   01/17/2020 143 135 - 144 mmol/L Final   09/29/2019 138 135 - 144 mmol/L Final     Potassium   Date Value Ref Range Status   06/21/2020 4.3 3.7 - 5.3 mmol/L Final   01/17/2020 4.2 3.7 - 5.3 mmol/L Final   09/29/2019 3.7 3.7 - 5.3 mmol/L Final     Chloride   Date Value Ref Range Status   06/21/2020 100 98 - 107 mmol/L Final   01/17/2020 103 98 - 107 mmol/L Final   09/29/2019 98 98 - 107 mmol/L Final     CO2   Date Value Ref Range Status   06/21/2020 24 20 - 31 mmol/L Final   01/17/2020 25 20 - 31 mmol/L Final   09/29/2019 27 20 - 31 mmol/L Final     BUN   Date Value Ref Range Status   06/21/2020 10 6 - 20 mg/dL Final   01/17/2020 17 6 - 20 mg/dL Final   09/29/2019 7 6 - 20 mg/dL Final     CREATININE   Date Value Ref Range Status   06/21/2020 0.50 0.50 - 0.90 mg/dL Final   01/17/2020 0.74 0.50 - 0.90 mg/dL Final   09/29/2019 0.40 (L) 0.50 - 0.90 mg/dL Final     Glucose   Date Value Ref Range Status   06/21/2020 123 (H) 70 - 99 mg/dL Final   01/17/2020 104 (H) 70 - 99 mg/dL Final   09/29/2019 139 (H) 70 - 99 mg/dL Final     Hepatic:     Amylase: No results found for: AMYLASE  Lipase:     CARDIAC ENZYMES: No results found for: CKTOTAL, CKMB, CKMBINDEX, TROPONINI  BNP: No results found for: BNP  Lipids: No results found for: CHOL, HDL    INR: No results found for: INR  Thyroid:   TSH   Date Value Ref Range Status   03/24/2014 1.74 0.35 - 5.50 mIU/L Final     Comment:     Performed at [facility] [address] [facility], [place] 3 [phone]     Urinalysis:     Cultures:-  -----------------------------------------------------------------    ABGs: No results found for: PHART, PO2ART, AXJ1KJC    Pulmonary Functions Testing Results:    No results found for: FEV1, FVC, YHV8NOQ, TLC, DLCO    CXR  Chest x-ray 06/21/2020  1. No acute cardiopulmonary abnormality. 2. Stable findings of emphysema and chronic obstructive physiology. CT Scans  CT chest 06/16/2021  Mediastinum:  Suboptimal evaluation due to low dose technique.  Thoracic   aorta demonstrates mild calcification without aneurysm.  Pulmonary trunk   appears nondilated.  No pericardial effusion.  No lymphadenopathy.  The   esophagus is grossly unremarkable.       Lungs/Pleura:  Emphysematous changes with associated biapical scarring. Additional scattered areas of scarring are also noted.  Diffuse bronchial   wall thickening. Barbi Schwalbe is patent.  No focal consolidation, pneumothorax or   pleural effusion.       No suspicious noncalcified pulmonary nodule or mass.             Assessment and Plan       ICD-10-CM   1. COPD, severe ([address]  J44.9   2. History of smoking at least 1 pack per day for at least 30 years  Z87.891   3. Chronic respiratory failure with hypoxia (HCC)  J96.11       Assessment:    Lung screening CT scan done on 06/16/2021 reviewed and shows severe emphysematous changes some areas of scarring in the upper lung peripherally but no nodule or mass. Pulmonary function test done 07/23/2021 shows FEV1 of 0.72 25% predicted with severe hyperinflation and airway trapping and severe reduction diffusion capacity. Pulmonary function test in 2016, 5 years ago she has severe COPD with prebronchodilator FEV1 was 31%  6-minute walk test done showed her lowest oxygen saturation was 90% so should not qualify for oxygen and she was scheduled for nocturnal oximetry which was not done will reschedule  Advised her to continue with the Spiriva and continue with Symbicort albuterol to be used as needed. Plan and recommendation:    Zithromax pack and prednisone 5 days to keep in case of purulent bronchitis  Nocturnal oximetry not done will reschedule  Continue Spiriva Respimat 2 puff once daily.   Continue Symbicort 160/4.52 puff twice daily. Albuterol aerosol or HFA to be used as needed. Flonase nasal spray  Medications prescription provided. Discussed with patient about compliance with medication and technique of using inhalers discussed  She will benefit from pulmonary rehabilitation once she is able and ready to do pulmonary rehabilitation. 6-minute walk test and pulmonary function test results seen  Low-dose screening CT scan results seen and she will need yearly LDCT should be June 2022  Complete smoking cessation discussed with patient again today. Vaccinations recommended   She had both the doses of covid vaccine  She need booster of the covid vaccine  Annual flu vaccine in fall  Had flu vaccine on 10/26/2021  She will need pneumonia vaccine if she did not have for the last 5 years  Prevnar 13 today  Maintain an active lifestyle   Questions answered pertaining to diagnosis and management explained importance of compliance with therapy     Follow-up in office in 4 months. It was my pleasure to evaluate [place] today. Please call with questions. Shary Mcburney, MD, MD             10/27/2021, 3:53 PM    Please note that this chart was generated using voice recognition Dragon dictation software. Although every effort was made to ensure the accuracy of this automated transcription, some errors in transcription may have occurred.

## 2021-11-02 ENCOUNTER — TELEPHONE (OUTPATIENT)
Dept: PULMONOLOGY | Age: 61
End: 2021-11-02

## 2021-11-02 DIAGNOSIS — J44.9 COPD, SEVERE (HCC): Primary | ICD-10-CM

## 2021-11-02 NOTE — TELEPHONE ENCOUNTER
Patient called an left a voicemail regarding nocturnal oximetry. I called patient back and left a voicemail stating the order will be sent to [facility]. They would be in contact with her to schedule.

## 2022-01-25 ENCOUNTER — TELEPHONE (OUTPATIENT)
Dept: PULMONOLOGY | Age: 62
End: 2022-01-25

## 2022-01-25 NOTE — TELEPHONE ENCOUNTER
Called patient back. Going to resend order to [facility] [place].

## 2022-01-25 NOTE — TELEPHONE ENCOUNTER
Patient called, asking if [facility] form is here. She needs it for her nebulizer. Informed her that when we receive it, we will put it on the doctors' desk for signature.

## 2022-01-25 NOTE — TELEPHONE ENCOUNTER
Dr. Ekta Guajardo signed the form, due to her nebulizer use. It was faxed to [facility] at [phone] and scanned into the pts chart. Pt was notified.

## 2022-01-31 ENCOUNTER — TELEPHONE (OUTPATIENT)
Dept: PULMONOLOGY | Age: 62
End: 2022-01-31

## 2022-01-31 NOTE — TELEPHONE ENCOUNTER
Patient called and left me voicemail in regards to a nebulizer. Called patient back and got patient voicemail and it state voicemail box is full.

## 2022-02-02 ENCOUNTER — TELEPHONE (OUTPATIENT)
Dept: PULMONOLOGY | Age: 62
End: 2022-02-02

## 2022-02-02 DIAGNOSIS — J44.9 COPD, SEVERE (HCC): Primary | ICD-10-CM

## 2022-02-11 DIAGNOSIS — J44.9 COPD, SEVERE (HCC): ICD-10-CM

## 2022-02-11 DIAGNOSIS — J96.11 CHRONIC RESPIRATORY FAILURE WITH HYPOXIA (HCC): ICD-10-CM

## 2022-02-11 DIAGNOSIS — J44.9 CHRONIC OBSTRUCTIVE PULMONARY DISEASE, UNSPECIFIED COPD TYPE (HCC): Primary | ICD-10-CM

## 2022-02-24 ENCOUNTER — TELEPHONE (OUTPATIENT)
Dept: PULMONOLOGY | Age: 62
End: 2022-02-24

## 2022-02-24 DIAGNOSIS — J41.8 MIXED SIMPLE AND MUCOPURULENT CHRONIC BRONCHITIS (HCC): Primary | ICD-10-CM

## 2022-02-24 NOTE — RESULT ENCOUNTER NOTE
She will have to come in for a walk test in order have oxygen with exertion the nocturnal test only covers nocturnal oxygen. Spoke with patient she wants Nocturnal oxygen order to be sent to [facility].

## 2022-02-25 ENCOUNTER — TELEPHONE (OUTPATIENT)
Dept: PULMONOLOGY | Age: 62
End: 2022-02-25

## 2022-02-25 NOTE — TELEPHONE ENCOUNTER
Patient has nocturnal oximetry done on 02/10/2022 she does qualify for nocturnal O2. Patient will need 2 L of nasal cannula to use at night.   Patient understand and agree to use nocturnal O2

## 2022-02-25 NOTE — TELEPHONE ENCOUNTER
Please addend October 2021 chart notes to state need for nocturnal oxygen.  [facility] needs chart notes stating the need for oxygen

## 2022-03-02 ENCOUNTER — OFFICE VISIT (OUTPATIENT)
Dept: PULMONOLOGY | Age: 62
End: 2022-03-02
Payer: COMMERCIAL

## 2022-03-02 VITALS
HEIGHT: 60 IN | OXYGEN SATURATION: 93 % | HEART RATE: 90 BPM | DIASTOLIC BLOOD PRESSURE: 68 MMHG | RESPIRATION RATE: 24 BRPM | BODY MASS INDEX: 26.31 KG/M2 | WEIGHT: 134 LBS | SYSTOLIC BLOOD PRESSURE: 97 MMHG

## 2022-03-02 DIAGNOSIS — G47.33 OSA (OBSTRUCTIVE SLEEP APNEA): ICD-10-CM

## 2022-03-02 DIAGNOSIS — J44.9 COPD, SEVERE (HCC): Primary | ICD-10-CM

## 2022-03-02 DIAGNOSIS — Z87.891 PERSONAL HISTORY OF TOBACCO USE: ICD-10-CM

## 2022-03-02 DIAGNOSIS — Z87.891 HISTORY OF SMOKING AT LEAST 1 PACK PER DAY FOR AT LEAST 30 YEARS: ICD-10-CM

## 2022-03-02 DIAGNOSIS — G47.34 NOCTURNAL HYPOXIA: ICD-10-CM

## 2022-03-02 PROCEDURE — 3017F COLORECTAL CA SCREEN DOC REV: CPT | Performed by: INTERNAL MEDICINE

## 2022-03-02 PROCEDURE — 4004F PT TOBACCO SCREEN RCVD TLK: CPT | Performed by: INTERNAL MEDICINE

## 2022-03-02 PROCEDURE — 99214 OFFICE O/P EST MOD 30 MIN: CPT | Performed by: INTERNAL MEDICINE

## 2022-03-02 PROCEDURE — G8484 FLU IMMUNIZE NO ADMIN: HCPCS | Performed by: INTERNAL MEDICINE

## 2022-03-02 PROCEDURE — G0296 VISIT TO DETERM LDCT ELIG: HCPCS | Performed by: INTERNAL MEDICINE

## 2022-03-02 PROCEDURE — 3023F SPIROM DOC REV: CPT | Performed by: INTERNAL MEDICINE

## 2022-03-02 PROCEDURE — G8419 CALC BMI OUT NRM PARAM NOF/U: HCPCS | Performed by: INTERNAL MEDICINE

## 2022-03-02 PROCEDURE — G8427 DOCREV CUR MEDS BY ELIG CLIN: HCPCS | Performed by: INTERNAL MEDICINE

## 2022-03-02 RX ORDER — BUDESONIDE AND FORMOTEROL FUMARATE DIHYDRATE 160; 4.5 UG/1; UG/1
2 AEROSOL RESPIRATORY (INHALATION) 2 TIMES DAILY
Qty: 1 EACH | Refills: 11 | Status: SHIPPED | OUTPATIENT
Start: 2022-03-02

## 2022-03-02 NOTE — LETTER
[address] [address]  Phone: [phone]  Fax: [phone]    Maren Castellano MD    March 2, 2022     [place] [address] [address] [facility] [address]    Patient: Robson Garcia   MR Number: [id number]   YOB: 1960   Date of Visit: 3/2/2022       Dear Uvaldo Hinojosa:    Thank you for referring Santana Orta to me for evaluation/treatment. Below are the relevant portions of my assessment and plan of care. If you have questions, please do not hesitate to call me. I look forward to following Carlos Mueller along with you.     Sincerely,      Maren Castellano MD

## 2022-03-02 NOTE — PROGRESS NOTES
OUTPATIENT PULMONARY PROGRESS NOTE      Patient:  Alistair Collier  MRN: [id number]    Consulting Physician: Juana Coleman  Reason for Consult: Severe COPD/chronic respiratory failure  [facility] Juana Coleman    HISTORY OF PRESENT ILLNESS:   The patient is a 62 y.o. female   Follow-up severe COPD/emphysema/history of hypoxic respiratory failure/history of smoking 40-pack-year    Since she was seen last time she had not had any exacerbation steroids or antibiotic use no ER visits for COPD exacerbation. She does have history of chronic cough according to patient there is no change in cough cough is intermittent sometimes she has a sputum production which is usually white in color denies any change in the color of the sputum volume the sputum. She does have dyspnea on exertion. According to patient if she walks slow she can walk 2 block she gets short of breath by going to grocery store sometimes she had to stop if she has to walk longer distance. According to patient she is able to do regular activities inside her house taking shower when she has to do clinicians to stop in between. She denies nocturnal awakening with cough and shortness of breath. She is taking Symbicort 2 puffs twice daily and she is taking Spiriva. She take albuterol as needed. She does have history of nocturnal awakening multiple times, daytime sleepiness, she take nap, when she wake up in the morning she feels tired if she is not able to sleep good at night because of multiple awakening. She has history of snoring. She had previous 6-minute walk test done and she did not qualify for home O2 nocturnal oximetry was done in February and she did need an qualified for nocturnal O2 she was started on oxygen at night just recently according to patient she is sleeping better at night since she started using oxygen.     She continues to smoke cigarette according to patient she is a smoking 5 cigarettes a day and try to quit smoking    Previous work-up  She had a screening CT scan done on 06/16/2021 which shows severe emphysematous and bullous changes there are areas of biapical/upper lobe peripheral scarring. There was no significant mass or nodule seen. Pulmonary function test done today 07/23/2021 showed FEV1 0.72 35%. Severe hyperinflation and air trapping with total lung capacity of 26% predicted and diffusion capacity is 4.63 26% predicted consistent with severe reduction in diffusion capacity    Initial office visit and evaluation on 06/04/2021  According to patient she has been diagnosed with COPD for long time. History of smoking for more than 40 years used to smoke 1 pack/day currently smoking 7 to 10 cigarettes a day and never was able to stop smoking. She was supposed to be on home oxygen according to patient she was using oxygen until about a year ago when her machine was broken she had not had any follow-up and she was in homeless shelter. Was not able to get home oxygen and had not been used to get. She does have dyspnea on minimal activity and exertion she can walk a few feet and get short of breath especially if she has to walk fast if she has to  stuff and she does get shortness of breath on regular activities at home. She does have chronic cough cough is usually productive of sputum sometimes she has yellowish colored sputum sometimes she has pain and clear color sputum but denies any change in the color of the sputum currently last week she was having more cough than usual with some color change in the sputum but for last 2 days is better. She does have wheezing off and on. She does complain of nocturnal awakening with cough and sometimes wheezing. She denies orthopnea PND or pedal edema. She denies shortness of breath when she is resting. She used to be on Spiriva. She was supposed to be on Symbicort also she use Symbicort sometime which is her friend's Symbicort.   She has albuterol aerosol which he use it every 4-6 hours. She does not complain of chest pain hemoptysis fever. According to patient her weight is always low and she denies any persistent weight loss her weight is usually up and down. She claims her appetite is fair. According to patient she had been to ER sometime with exacerbation last time she did get steroid and possibly antibiotic. She does have history of postnasal drip and sometimes she take antihistamine and Flonase nasal spray. She had a pulmonary function test done on 10/13/2016 consistent with severe COPD her FEV1 prebronchodilator was 0.7 231% and postbronchodilator was 1.08 46% diffusion capacity was 48%    Her last chest x-ray was 06/21/2020 which was consistent with hyperinflation/emphysema. Past Medical History:        Diagnosis Date    Anxiety     COPD (chronic obstructive pulmonary disease) ([address]     COPD exacerbation (HCC)     Cough     yellow/green sputum    Current every day smoker     Current every day smoker     Depression     GERD (gastroesophageal reflux disease)     Headache(784.0)     Mid sternal chest pain     SOB (shortness of breath)        Past Surgical History:        Procedure Laterality Date    COLONOSCOPY  4 28 14    egd  biopsy each    TUBAL LIGATION         Allergies:     Allergies   Allergen Reactions    Neosporin [Neomycin-Polymyx-Gramicid] Itching    Penicillins Rash         Home Meds:   Outpatient Encounter Medications as of 3/2/2022   Medication Sig Dispense Refill    tiotropium (SPIRIVA RESPIMAT) 2.5 MCG/ACT AERS inhaler Inhale 2 puffs into the lungs daily 1 each 11    budesonide-formoterol (SYMBICORT) 160-4.5 MCG/ACT AERO Inhale 2 puffs into the lungs 2 times daily 1 each 11    buPROPion (WELLBUTRIN XL) 150 MG extended release tablet       albuterol sulfate HFA (VENTOLIN HFA) 108 (90 Base) MCG/ACT inhaler Inhale 2 puffs into the lungs 4 times daily as needed for Wheezing 18 g 11    fluticasone (FLONASE) 50 MCG/ACT nasal spray 2 sprays by Each Nostril route daily 1 each 5    albuterol (PROVENTIL) (2.5 MG/3ML) 0.083% nebulizer solution Take 3 mLs by nebulization every 6 hours as needed for Wheezing 120 each 5    ipratropium-albuterol (DUONEB) 0.5-2.5 (3) MG/3ML SOLN nebulizer solution USE 3 ML(1 VIAL) VIA NEBULIZER EVERY 4 HOURS AS NEEDED 90 mL 0    albuterol sulfate HFA (PROVENTIL HFA) 108 (90 Base) MCG/ACT inhaler INHALE 2 PUFFS BY MOUTH EVERY 6 HOURS AS NEEDED 6.7 g 0    albuterol sulfate HFA (PROVENTIL HFA) 108 (90 Base) MCG/ACT inhaler Inhale 1-2 puffs into the lungs every 4 hours as needed for Wheezing or Shortness of Breath (Space out to every 6 hours as symptoms improve) Space out to every 6 hours as symptoms improve. 1 Inhaler 0    pantoprazole (PROTONIX) 20 MG tablet TAKE 1 TABLET BY MOUTH DAILY 30 tablet 0    fluticasone (FLONASE) 50 MCG/ACT nasal spray SHAKE LIQUID AND USE 1 SPRAY IN EACH NOSTRIL DAILY 1 Bottle 0    famotidine (PEPCID) 20 MG tablet TAKE 1 TABLET BY MOUTH TWICE DAILY 30 tablet 0    vitamin D3 (CHOLECALCIFEROL) 400 units TABS tablet TAKE 1 TABLET BY MOUTH DAILY 30 tablet 0    benzonatate (TESSALON) 100 MG capsule TAKE ONE CAPSULE BY MOUTH THREE TIMES DAILY AS NEEDED FOR COUGH 30 capsule 0    tolterodine (DETROL LA) 4 MG ER capsule Take 1 capsule by mouth daily 30 capsule 3    loratadine (CLARITIN) 10 MG tablet Take 1 tablet by mouth daily. 30 tablet 0    guaiFENesin 400 MG tablet Take 1 tablet by mouth 2 times daily as needed for Cough.  Drink plenty of water when taking this medication 30 tablet 0    traZODone (DESYREL) 100 MG tablet Take 100 mg by mouth 2 times daily as needed       sertraline (ZOLOFT) 100 MG tablet Take 200 mg by mouth daily       hydrOXYzine (VISTARIL) 50 MG capsule Take 50 mg by mouth 2 times daily as needed       [DISCONTINUED] tiotropium (SPIRIVA RESPIMAT) 2.5 MCG/ACT AERS inhaler Inhale 2 puffs into the lungs daily 1 Inhaler 5    [DISCONTINUED] tremor, and weight changes  MUSCULOSKELETAL:  negative for  myalgias, arthralgias, joint swelling, stiff joints, and muscle weakness  NEUROLOGICAL:  negative for headaches, dizziness, seizures, memory problems, speech problems, visual disturbance, gait problems, tremor, dysphagia, weakness, numbness, syncope, and tingling  BEHAVIOR/PSYCH:  negative for decreased sleep, decreased energy level, increased energy level, poor concentration, depressed mood, and anxiety          Physical Exam:    Vitals: BP 97/68 (Site: Left Upper Arm, Position: Sitting, Cuff Size: Medium Adult)   Pulse 90   Resp 24   Ht 5' (1.524 m)   Wt 134 lb (60.8 kg)   SpO2 93% Comment: Room air at rest  BMI 26.17 kg/m²   Last 3 weights: Wt Readings from Last 3 Encounters:   03/02/22 134 lb (60.8 kg)   10/27/21 115 lb (52.2 kg)   07/23/21 112 lb (50.8 kg)     Body mass index is 26.17 kg/m². Physical Examination:   General appearance - alert, acyanotic, in no respiratory distress and chronically ill appearing  Mental status - alert, oriented to person, place, and time  Eyes - pupils equal and reactive, extraocular eye movements intact, sclera anicteric  Ears - right ear normal, left ear normal  Nose - normal and patent, no erythema, discharge or polyps  Mouth - mucous membranes moist, pharynx normal without lesions  Neck - supple, no significant adenopathy  Chest -she has mild tachypnea but not in distress when she talks as she does use accessory muscles of neck. Bilateral symmetrical chest movement, increased resonance on percussion, air entry is severely reduced and distant bilaterally with prolonged expiration, no expiratory wheezing rhonchi or crackles.   Heart - normal rate, regular rhythm, normal S1, S2, no murmurs, rubs, clicks or gallops  Abdomen - soft, nontender, nondistended, no masses or organomegaly  Neurological - alert, oriented, normal speech, no focal findings or movement disorder noted  Extremities - peripheral pulses normal, no pedal edema, no clubbing or cyanosis  Skin - normal coloration and turgor, no rashes, no suspicious skin lesions noted     Immunization History   Administered Date(s) Administered    Influenza Virus Vaccine 12/19/2013    Pneumococcal Conjugate 13-valent ([id number]) 10/27/2021     LABS:    CBC:   WBC   Date Value Ref Range Status   06/21/2020 7.3 3.5 - 11.3 k/uL Final   01/17/2020 9.0 3.5 - 11.3 k/uL Final   09/29/2019 12.2 (H) 3.5 - 11.3 k/uL Final     Hemoglobin   Date Value Ref Range Status   06/21/2020 15.5 (H) 11.9 - 15.1 g/dL Final   01/17/2020 13.0 11.9 - 15.1 g/dL Final   09/29/2019 12.5 11.9 - 15.1 g/dL Final     Platelets   Date Value Ref Range Status   06/21/2020 273 138 - 453 k/uL Final   01/17/2020 262 138 - 453 k/uL Final   09/29/2019 231 138 - 453 k/uL Final     BMP:   Sodium   Date Value Ref Range Status   06/21/2020 138 135 - 144 mmol/L Final   01/17/2020 143 135 - 144 mmol/L Final   09/29/2019 138 135 - 144 mmol/L Final     Potassium   Date Value Ref Range Status   06/21/2020 4.3 3.7 - 5.3 mmol/L Final   01/17/2020 4.2 3.7 - 5.3 mmol/L Final   09/29/2019 3.7 3.7 - 5.3 mmol/L Final     Chloride   Date Value Ref Range Status   06/21/2020 100 98 - 107 mmol/L Final   01/17/2020 103 98 - 107 mmol/L Final   09/29/2019 98 98 - 107 mmol/L Final     CO2   Date Value Ref Range Status   06/21/2020 24 20 - 31 mmol/L Final   01/17/2020 25 20 - 31 mmol/L Final   09/29/2019 27 20 - 31 mmol/L Final     BUN   Date Value Ref Range Status   06/21/2020 10 6 - 20 mg/dL Final   01/17/2020 17 6 - 20 mg/dL Final   09/29/2019 7 6 - 20 mg/dL Final     CREATININE   Date Value Ref Range Status   06/21/2020 0.50 0.50 - 0.90 mg/dL Final   01/17/2020 0.74 0.50 - 0.90 mg/dL Final   09/29/2019 0.40 (L) 0.50 - 0.90 mg/dL Final     Glucose   Date Value Ref Range Status   06/21/2020 123 (H) 70 - 99 mg/dL Final   01/17/2020 104 (H) 70 - 99 mg/dL Final   09/29/2019 139 (H) 70 - 99 mg/dL Final     Hepatic:     Amylase:  No results found for: AMYLASE  Lipase:     CARDIAC ENZYMES: No results found for: CKTOTAL, CKMB, CKMBINDEX, TROPONINI  BNP: No results found for: BNP  Lipids: No results found for: CHOL, HDL    INR: No results found for: INR  Thyroid:   TSH   Date Value Ref Range Status   03/24/2014 1.74 0.35 - 5.50 mIU/L Final     Comment:     Performed at [facility] [address] [facility], [place] [address] [phone]     Urinalysis:     Cultures:-  -----------------------------------------------------------------    ABGs: No results found for: PHART, PO2ART, EFV0TBE    Pulmonary Functions Testing Results:    No results found for: FEV1, FVC, ILX1NRO, TLC, DLCO    CXR  Chest x-ray 06/21/2020  1. No acute cardiopulmonary abnormality. 2. Stable findings of emphysema and chronic obstructive physiology. CT Scans  CT chest 06/16/2021  Mediastinum:  Suboptimal evaluation due to low dose technique.  Thoracic   aorta demonstrates mild calcification without aneurysm.  Pulmonary trunk   appears nondilated.  No pericardial effusion.  No lymphadenopathy.  The   esophagus is grossly unremarkable.       Lungs/Pleura:  Emphysematous changes with associated biapical scarring. Additional scattered areas of scarring are also noted.  Diffuse bronchial   wall thickening. Lita Jus is patent.  No focal consolidation, pneumothorax or   pleural effusion.       No suspicious noncalcified pulmonary nodule or mass.             Assessment and Plan       ICD-10-CM   1. COPD, severe ([address]  J44.9   2. History of smoking at least 1 pack per day for at least 30 years  Z87.891   3. Chronic respiratory failure with hypoxia (HCC)  J96.11       Assessment:    Lung screening CT scan done on 06/16/2021 showed severe emphysematous changes some areas of scarring in the upper lung peripherally but no nodule or mass.   Pulmonary function test done 07/23/2021 shows FEV1 of 0.72 35% predicted with severe hyperinflation and airway trapping and severe reduction diffusion capacity. Pulmonary function test in 2016, 5 years ago she has severe COPD with prebronchodilator FEV1 was 31%  6-minute walk test done showed her lowest oxygen saturation was 90% so she did not qualify for oxygen. Nocturnal oximetry was done and she does have nocturnal hypoxia which could be related to COPD patient has symptoms of sleep apnea discussed with her to get a sleep study as she will benefit from CPAP therapy if she has sleep apnea especially with severe COPD. Advised her to continue with the Spiriva and continue with Symbicort albuterol to be used as needed. Plan and recommendation:    Continue Oxygen at 2 L at night  Nocturnal oximetry results seen discussed with  Continue Spiriva Respimat 2 puff once daily. Continue Symbicort 160/4.52 puff twice daily. Albuterol aerosol or HFA to be used as needed. Flonase nasal spray  Medications prescription provided. Discussed with patient about compliance with medication and technique of using inhalers discussed  She will benefit from pulmonary rehabilitation once she is able and ready to do pulmonary rehabilitation. Last 6-minute walk test and pulmonary function test results seen  Low-dose screening CT scan results seen and she will need yearly LDCT should be June 2022  Complete smoking cessation discussed with patient again today. Vaccinations recommended   She had both the doses of covid vaccine  She need booster of the covid vaccine  Annual flu vaccine in fall  Had flu vaccine on 10/26/2021  She will need pneumonia vaccine if she did not have for the last 5 years  Prevnar 13 today  Maintain an active lifestyle   Questions answered pertaining to diagnosis and management explained importance of compliance with therapy     Sleep study baseline and if baseline study shows sleep apnea then CPAP titration study. Regular sleep-wake cycle sleep hygiene discussed. Increase activity and exercise.   Avoidance of long nap during the daytime. Weight loss discussed. Follow-up in office in 4 months. It was my pleasure to evaluate Nanette Farley today. Please call with questions. Tony Allison MD, MD             3/2/2022, 3:54 PM    Please note that this chart was generated using voice recognition Dragon dictation software. Although every effort was made to ensure the accuracy of this automated transcription, some errors in transcription may have occurred. Low Dose CT (LDCT) Lung Screening criteria met:     Age 55-77(Medicare) or 50-80 ([facility])   Pack year smoking >30 (Medicare) or >20 ([facility])   Still smoking or less than 15 year since quit   No sign or symptoms of lung cancer   > 11 months since last LDCT     Risks and benefits of lung cancer screening with LDCT scans discussed:    Significance of positive screen - False-positive LDCT results often occur. 95% of all positive results do not lead to a diagnosis of cancer. Usually further imaging can resolve most false-positive results; however, some patients may require invasive procedures. Over diagnosis risk - 10% to 12% of screen-detected lung cancer cases are over diagnosed--that is, the cancer would not have been detected in the patient's lifetime without the screening. Need for follow up screens annually to continue lung cancer screening effectiveness     Risks associated with radiation from annual LDCT- Radiation exposure is about the same as for a mammogram, which is about 1/3 of the annual background radiation exposure from everyday life. Starting screening at age 54 is not likely to increase cancer risk from radiation exposure. Patients with comorbidities resulting in life expectancy of < 10 years, or that would preclude treatment of an abnormality identified on CT, should not be screened due to lack of benefit.     To obtain maximal benefit from this screening, smoking cessation and long-term abstinence from smoking is critical

## 2022-03-02 NOTE — PATIENT INSTRUCTIONS
CT ORDER TIME STAMPED 5:12 PM / Tried calling pt to schedule - Voicemail full. Mailing CT order with note to call scheduling   LS   What is lung cancer screening? Lung cancer screening is a way in which doctors check the lungs for early signs of cancer in people who have no symptoms of lung cancer. A low-dose CT scan uses much less radiation than a normal CT scan and shows a more detailed image of the lungs than a standard X-ray. The goal of lung cancer screening is to find cancer early, before it has a chance to grow, spread, or cause problems. One large study found that smokers who were screened with low-dose CT scans were less likely to die of lung cancer than those who were screened with standard X-ray. Below is a summary of the things you need to know regarding screening for lung cancer with low-dose computed tomography (LDCT). This is a screening program that involves routine annual screening with LDCT studies of the lung. The LDCTs are done using low-dose radiation that is not thought to increase your cancer risk. If you have other serious medical conditions (other cancers, congestive heart failure) that limit your life expectancy to less than 10 years, you should not undergo lung cancer screening with LDCT. The chance is 20%-60% that the LDCT result will show abnormalities. This would require additional testing which could include repeat imaging or even invasive procedures. Most (about 95%) of \"abnormal\" LDCT results are false in the sense that no lung cancer is ultimately found. Additionally, some (about 10%) of the cancers found would not affect your life expectancy, even if undetected and untreated. If you are still smoking, the single most important thing that you can do to reduce your risk of dying of lung cancer is to quit. For this screening to be covered by Medicare and most other insurers, strict criteria must be met.   If you do not meet these criteria, but still wish to undergo LDCT testing, you will be required to sign a waiver indicating your willingness to pay for the scan.

## 2022-03-03 NOTE — PROGRESS NOTES
Pulmonary function test  Date of study 07/23/2021. Spirometry shows FVC is 1.53 60% predicted. FEV1 is 0.72 35% predicted consistent with severe obstructive ventilatory impairment. FEV1 FVC ratio is 47% and flow volume loop is consistent with severe obstructive ventilatory impairment. Lung volume shows residual volume is 663% of predicted and total lung capacity is 11.65 to 286% predicted consistent with severe hyperinflation/airway trapping. Diffusion capacity is 4.63 26% predicted consistent with severe reduction diffusion capacity likely secondary emphysema or concomitant pulmonary vascular disease or intraparenchymal lung disease. Impression: This pulmonary function test is consistent with severe obstructive mental impairment. No postbronchodilator response is performed. Lung volumes consistent with severe hyperinflation/airway trapping. Severe reduction diffusion capacity likely secondary to emphysema and/or concomitant pulmonary vascular disease or intraparenchymal lung disease. Clinical correlation is recommended. No

## 2022-06-01 ENCOUNTER — TELEPHONE (OUTPATIENT)
Dept: ONCOLOGY | Age: 62
End: 2022-06-01

## 2022-07-05 ENCOUNTER — HOSPITAL ENCOUNTER (OUTPATIENT)
Dept: CT IMAGING | Age: 62
Discharge: HOME | End: 2022-07-07
Payer: COMMERCIAL

## 2022-07-05 DIAGNOSIS — Z87.891 PERSONAL HISTORY OF TOBACCO USE: ICD-10-CM

## 2022-07-05 PROCEDURE — 71271 CT THORAX LUNG CANCER SCR C-: CPT

## 2022-07-12 ENCOUNTER — HOSPITAL ENCOUNTER (OUTPATIENT)
Dept: SLEEP CENTER | Age: 62
Discharge: HOME | End: 2022-07-14
Payer: COMMERCIAL

## 2022-07-12 DIAGNOSIS — G47.33 OSA (OBSTRUCTIVE SLEEP APNEA): ICD-10-CM

## 2022-07-12 PROCEDURE — 95810 POLYSOM 6/> YRS 4/> PARAM: CPT

## 2022-07-26 LAB — STATUS: NORMAL

## 2023-06-05 ENCOUNTER — TELEPHONE (OUTPATIENT)
Dept: ONCOLOGY | Age: 63
End: 2023-06-05